# Patient Record
Sex: FEMALE | Race: BLACK OR AFRICAN AMERICAN | NOT HISPANIC OR LATINO | URBAN - METROPOLITAN AREA
[De-identification: names, ages, dates, MRNs, and addresses within clinical notes are randomized per-mention and may not be internally consistent; named-entity substitution may affect disease eponyms.]

---

## 2017-03-02 ENCOUNTER — EMERGENCY (EMERGENCY)
Facility: HOSPITAL | Age: 37
LOS: 0 days | Discharge: HOME | End: 2017-03-02
Attending: EMERGENCY MEDICINE
Payer: SELF-PAY

## 2017-03-02 VITALS
HEIGHT: 71 IN | OXYGEN SATURATION: 98 % | WEIGHT: 210.1 LBS | SYSTOLIC BLOOD PRESSURE: 147 MMHG | RESPIRATION RATE: 16 BRPM | TEMPERATURE: 98 F | HEART RATE: 100 BPM | DIASTOLIC BLOOD PRESSURE: 84 MMHG

## 2017-03-02 VITALS
HEART RATE: 66 BPM | TEMPERATURE: 98 F | DIASTOLIC BLOOD PRESSURE: 50 MMHG | OXYGEN SATURATION: 100 % | RESPIRATION RATE: 19 BRPM | SYSTOLIC BLOOD PRESSURE: 117 MMHG

## 2017-03-02 DIAGNOSIS — M25.472 EFFUSION, LEFT ANKLE: ICD-10-CM

## 2017-03-02 DIAGNOSIS — R60.0 LOCALIZED EDEMA: ICD-10-CM

## 2017-03-02 DIAGNOSIS — E11.9 TYPE 2 DIABETES MELLITUS WITHOUT COMPLICATIONS: ICD-10-CM

## 2017-03-02 LAB
ALBUMIN SERPL ELPH-MCNC: 3.2 G/DL — LOW (ref 3.3–5)
ALP SERPL-CCNC: 77 U/L — SIGNIFICANT CHANGE UP (ref 40–120)
ALT FLD-CCNC: 23 U/L — SIGNIFICANT CHANGE UP (ref 12–78)
ANION GAP SERPL CALC-SCNC: 7 MMOL/L — SIGNIFICANT CHANGE UP (ref 5–17)
AST SERPL-CCNC: 20 U/L — SIGNIFICANT CHANGE UP (ref 15–37)
BASOPHILS # BLD AUTO: 0 K/UL — SIGNIFICANT CHANGE UP (ref 0–0.2)
BASOPHILS NFR BLD AUTO: 0.5 % — SIGNIFICANT CHANGE UP (ref 0–2)
BILIRUB SERPL-MCNC: 0.3 MG/DL — SIGNIFICANT CHANGE UP (ref 0.2–1.2)
BUN SERPL-MCNC: 11 MG/DL — SIGNIFICANT CHANGE UP (ref 7–23)
CALCIUM SERPL-MCNC: 8.5 MG/DL — SIGNIFICANT CHANGE UP (ref 8.5–10.1)
CHLORIDE SERPL-SCNC: 103 MMOL/L — SIGNIFICANT CHANGE UP (ref 96–108)
CO2 SERPL-SCNC: 29 MMOL/L — SIGNIFICANT CHANGE UP (ref 22–31)
CREAT SERPL-MCNC: 0.83 MG/DL — SIGNIFICANT CHANGE UP (ref 0.5–1.3)
EOSINOPHIL # BLD AUTO: 0.1 K/UL — SIGNIFICANT CHANGE UP (ref 0–0.5)
EOSINOPHIL NFR BLD AUTO: 1.1 % — SIGNIFICANT CHANGE UP (ref 0–6)
GLUCOSE SERPL-MCNC: 91 MG/DL — SIGNIFICANT CHANGE UP (ref 70–99)
HCG SERPL-ACNC: <1 MIU/ML — SIGNIFICANT CHANGE UP
HCT VFR BLD CALC: 38.1 % — SIGNIFICANT CHANGE UP (ref 34.5–45)
HGB BLD-MCNC: 12.6 G/DL — SIGNIFICANT CHANGE UP (ref 11.5–15.5)
LYMPHOCYTES # BLD AUTO: 2.1 K/UL — SIGNIFICANT CHANGE UP (ref 1–3.3)
LYMPHOCYTES # BLD AUTO: 23.6 % — SIGNIFICANT CHANGE UP (ref 13–44)
MCHC RBC-ENTMCNC: 29.7 PG — SIGNIFICANT CHANGE UP (ref 27–34)
MCHC RBC-ENTMCNC: 33.2 GM/DL — SIGNIFICANT CHANGE UP (ref 32–36)
MCV RBC AUTO: 89.6 FL — SIGNIFICANT CHANGE UP (ref 80–100)
MONOCYTES # BLD AUTO: 0.8 K/UL — SIGNIFICANT CHANGE UP (ref 0–0.9)
MONOCYTES NFR BLD AUTO: 9 % — SIGNIFICANT CHANGE UP (ref 2–14)
NEUTROPHILS # BLD AUTO: 6 K/UL — SIGNIFICANT CHANGE UP (ref 1.8–7.4)
NEUTROPHILS NFR BLD AUTO: 65.7 % — SIGNIFICANT CHANGE UP (ref 43–77)
NT-PROBNP SERPL-SCNC: 8 PG/ML — SIGNIFICANT CHANGE UP (ref 0–125)
PLATELET # BLD AUTO: 511 K/UL — HIGH (ref 150–400)
POTASSIUM SERPL-MCNC: 4.1 MMOL/L — SIGNIFICANT CHANGE UP (ref 3.5–5.3)
POTASSIUM SERPL-SCNC: 4.1 MMOL/L — SIGNIFICANT CHANGE UP (ref 3.5–5.3)
PROT SERPL-MCNC: 8.2 GM/DL — SIGNIFICANT CHANGE UP (ref 6–8.3)
RBC # BLD: 4.26 M/UL — SIGNIFICANT CHANGE UP (ref 3.8–5.2)
RBC # FLD: 12.1 % — SIGNIFICANT CHANGE UP (ref 11–15)
SODIUM SERPL-SCNC: 139 MMOL/L — SIGNIFICANT CHANGE UP (ref 135–145)
WBC # BLD: 9.1 K/UL — SIGNIFICANT CHANGE UP (ref 3.8–10.5)
WBC # FLD AUTO: 9.1 K/UL — SIGNIFICANT CHANGE UP (ref 3.8–10.5)

## 2017-03-02 PROCEDURE — 99285 EMERGENCY DEPT VISIT HI MDM: CPT

## 2017-03-02 PROCEDURE — 73610 X-RAY EXAM OF ANKLE: CPT | Mod: 26,50

## 2017-03-02 PROCEDURE — 93970 EXTREMITY STUDY: CPT | Mod: 26

## 2017-03-02 RX ORDER — KETOROLAC TROMETHAMINE 30 MG/ML
30 SYRINGE (ML) INJECTION ONCE
Qty: 0 | Refills: 0 | Status: DISCONTINUED | OUTPATIENT
Start: 2017-03-02 | End: 2017-03-02

## 2017-03-02 RX ADMIN — Medication 30 MILLIGRAM(S): at 12:55

## 2017-03-02 RX ADMIN — Medication 30 MILLIGRAM(S): at 12:56

## 2017-03-02 NOTE — ED ADULT NURSE NOTE - OBJECTIVE STATEMENT
patient c/o of bilateral ankle pain started 1 week ago , denied chest pain or difficulty breathing at this

## 2017-03-02 NOTE — ED ADULT NURSE REASSESSMENT NOTE - NS ED NURSE REASSESS COMMENT FT1
patient A&Ox3 steady gait in no acute distress no pain at this time , discharge as orders heplock remove , left ER self ambulated with family on her side

## 2017-03-02 NOTE — ED PROVIDER NOTE - PHYSICAL EXAMINATION
Gen: Alert, Well appearing. NAD  Head: NC, AT, PERRL, EOMI, normal lids/conjunctiva ENT: Bilateral TM WNL, normal hearing, patent oropharynx without erythema/exudate, uvula midline Neck: supple, no tenderness/meningismus/JVD Pulm: Bilateral clear BS, normal resp effort, no wheeze/stridor/retractions  CV: RRR, no M/R/G, +dist pulses Abd: soft, NT/ND, +BS, no guarding/rebound tenderness  Mskel: no edema/erythema/cyanosis Skin: no rash   Neuro: AAOx3, no sensory/motor deficits, CN 2-12 intact   b/l ankle swelling and b/l tenderness. from of ankles, dp/pt intact, no erythema, no calf tenderness

## 2017-03-02 NOTE — ED ADULT TRIAGE NOTE - CHIEF COMPLAINT QUOTE
bilateral ankle pain and swelling (trace edema)   denies shortness of breath   2016 with gestational diabetes

## 2017-03-02 NOTE — ED PROVIDER NOTE - MEDICAL DECISION MAKING DETAILS
Likely dependent edema.Sono neg for DVT. xray wnl. Discussed results and outcome of testing with the patient.  Patient given copy of available results. Patient advised to please follow up with their PMD within the next 24 hours and return to the Emergency Department for worsening symptoms or any other concerns.

## 2017-07-29 ENCOUNTER — EMERGENCY (EMERGENCY)
Facility: HOSPITAL | Age: 37
LOS: 1 days | Discharge: LEFT BEFORE DISCHARGE | End: 2017-07-29

## 2017-07-29 VITALS
WEIGHT: 197.98 LBS | HEIGHT: 71 IN | OXYGEN SATURATION: 99 % | HEART RATE: 96 BPM | DIASTOLIC BLOOD PRESSURE: 53 MMHG | SYSTOLIC BLOOD PRESSURE: 118 MMHG | RESPIRATION RATE: 18 BRPM | TEMPERATURE: 98 F

## 2017-07-29 DIAGNOSIS — R07.9 CHEST PAIN, UNSPECIFIED: ICD-10-CM

## 2017-07-29 NOTE — ED ADULT NURSE NOTE - EXPLANATION OF PATIENT'S REASON FOR LEAVING
"I don't want to wait" wishes to leave without being seen by MD. Educated of risks of leaving without being seen, states will follow up with PMD. Seen leaving ED with strong steady straight gait with child and spouse.

## 2017-07-29 NOTE — ED ADULT TRIAGE NOTE - CHIEF COMPLAINT QUOTE
reports having dull pain under left breast, worsens with touch, denies cough, fevers, chills, vomiting, nausea, diarrhea.
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
## 2018-04-13 NOTE — ED ADULT NURSE NOTE - EXTENSIONS OF SELF_ADULT
Pt leaves  asking what else she can do for her migraine. She states in vm she is having a hard time getting rid of it. Please advise.   
Writer contacted patient and she states that she did take some naproxen as well as the imitrex and it does seem to be getting better now.  Advised that she be seen if it does not improve and patient verbalized understanding.  
None

## 2018-08-13 ENCOUNTER — EMERGENCY (EMERGENCY)
Facility: HOSPITAL | Age: 38
LOS: 0 days | Discharge: HOME | End: 2018-08-13
Attending: EMERGENCY MEDICINE
Payer: COMMERCIAL

## 2018-08-13 VITALS
OXYGEN SATURATION: 100 % | WEIGHT: 216.93 LBS | TEMPERATURE: 98 F | HEART RATE: 84 BPM | SYSTOLIC BLOOD PRESSURE: 115 MMHG | HEIGHT: 71 IN | RESPIRATION RATE: 16 BRPM | DIASTOLIC BLOOD PRESSURE: 76 MMHG
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
## 2018-08-13 DIAGNOSIS — M79.672 PAIN IN LEFT FOOT: ICD-10-CM

## 2018-08-13 DIAGNOSIS — E11.9 TYPE 2 DIABETES MELLITUS WITHOUT COMPLICATIONS: ICD-10-CM
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
## 2018-08-13 DIAGNOSIS — Z79.4 LONG TERM (CURRENT) USE OF INSULIN: ICD-10-CM

## 2018-08-13 DIAGNOSIS — S93.602A UNSPECIFIED SPRAIN OF LEFT FOOT, INITIAL ENCOUNTER: ICD-10-CM

## 2018-08-13 PROCEDURE — 73630 X-RAY EXAM OF FOOT: CPT | Mod: 26,LT

## 2018-08-13 PROCEDURE — 99283 EMERGENCY DEPT VISIT LOW MDM: CPT

## 2018-08-13 NOTE — ED ADULT NURSE NOTE - NSIMPLEMENTINTERV_GEN_ALL_ED
Implemented All Universal Safety Interventions:  [place] to call system. Call bell, personal items and telephone within reach. Instruct patient to call for assistance. Room bathroom lighting operational. Non-slip footwear when patient is off stretcher. Physically safe environment: no spills, clutter or unnecessary equipment. Stretcher in lowest position, wheels locked, appropriate side rails in place.

## 2018-08-13 NOTE — ED ADULT NURSE NOTE - OBJECTIVE STATEMENT
as per pt " for the past week I have noticed my left ankle swollen with rest the swelling goes away then appear again during the day."

## 2018-10-24 ENCOUNTER — EMERGENCY (EMERGENCY)
Facility: HOSPITAL | Age: 38
LOS: 0 days | Discharge: HOME | End: 2018-10-24
Attending: EMERGENCY MEDICINE
Payer: COMMERCIAL

## 2018-10-24 VITALS
HEIGHT: 71 IN | RESPIRATION RATE: 17 BRPM | OXYGEN SATURATION: 99 % | WEIGHT: 210.1 LBS | SYSTOLIC BLOOD PRESSURE: 127 MMHG | HEART RATE: 79 BPM | DIASTOLIC BLOOD PRESSURE: 67 MMHG | TEMPERATURE: 98 F
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
## 2018-10-24 DIAGNOSIS — M54.2 CERVICALGIA: ICD-10-CM

## 2018-10-24 DIAGNOSIS — E11.9 TYPE 2 DIABETES MELLITUS WITHOUT COMPLICATIONS: ICD-10-CM
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
## 2018-10-24 DIAGNOSIS — S16.1XXA STRAIN OF MUSCLE, FASCIA AND TENDON AT NECK LEVEL, INITIAL ENCOUNTER: ICD-10-CM

## 2018-10-24 DIAGNOSIS — D86.9 SARCOIDOSIS, UNSPECIFIED: ICD-10-CM

## 2018-10-24 PROCEDURE — 99284 EMERGENCY DEPT VISIT MOD MDM: CPT

## 2018-10-24 PROCEDURE — 72125 CT NECK SPINE W/O DYE: CPT | Mod: 26

## 2018-10-24 RX ORDER — IBUPROFEN 200 MG
1 TABLET ORAL
Qty: 20 | Refills: 0
Start: 2018-10-24 | End: 2018-10-28

## 2018-10-24 RX ORDER — CYCLOBENZAPRINE HYDROCHLORIDE 10 MG/1
1 TABLET, FILM COATED ORAL
Qty: 15 | Refills: 0
Start: 2018-10-24 | End: 2018-10-28

## 2018-10-24 RX ORDER — DIAZEPAM 5 MG
5 TABLET ORAL ONCE
Qty: 0 | Refills: 0 | Status: DISCONTINUED | OUTPATIENT
Start: 2018-10-24 | End: 2018-10-24

## 2018-10-24 RX ORDER — IBUPROFEN 200 MG
600 TABLET ORAL ONCE
Qty: 0 | Refills: 0 | Status: DISCONTINUED | OUTPATIENT
Start: 2018-10-24 | End: 2018-10-24

## 2018-10-24 NOTE — ED ADULT NURSE NOTE - NSIMPLEMENTINTERV_GEN_ALL_ED
Implemented All Universal Safety Interventions:  [place] to call system. Call bell, personal items and telephone within reach. Instruct patient to call for assistance. Room bathroom lighting operational. Non-slip footwear when patient is off stretcher. Physically safe environment: no spills, clutter or unnecessary equipment. Stretcher in lowest position, wheels locked, appropriate side rails in place.

## 2018-10-24 NOTE — ED PROVIDER NOTE - NEUROLOGICAL, MLM
Alert and oriented, no focal deficits, no motor or sensory deficits to bilateral upper and lower extremities
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
## 2018-10-24 NOTE — ED PROVIDER NOTE - NSFOLLOWUPCLINICS_GEN_ALL_ED_FT
[facility]  Internal Medicine  [address] [place] [address]  Phone: [phone]  Fax:   Follow Up Time:
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
## 2018-10-24 NOTE — ED PROVIDER NOTE - MUSCULOSKELETAL, MLM
Spine appears normal, range of motion is not limited, + midline and left sided lower cervical tenderness, limited range of motion looking toward left
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
## 2019-04-02 NOTE — ED PROVIDER NOTE - CPE EDP NEURO NORM
Dr. Mojica called to inform us that pts pre-op CXR shows a vague opacity over posterior mid thoracic spine on lateral view. CT thorax w/o contrast is recommended to get a better view. Order placed and will have Dr. Millard review & sign.    normal...

## 2019-09-30 ENCOUNTER — EMERGENCY (EMERGENCY)
Facility: HOSPITAL | Age: 39
LOS: 0 days | Discharge: HOME | End: 2019-09-30
Attending: EMERGENCY MEDICINE
Payer: COMMERCIAL

## 2019-09-30 VITALS
WEIGHT: 210.1 LBS | SYSTOLIC BLOOD PRESSURE: 128 MMHG | TEMPERATURE: 98 F | HEIGHT: 71 IN | RESPIRATION RATE: 18 BRPM | DIASTOLIC BLOOD PRESSURE: 71 MMHG | OXYGEN SATURATION: 100 % | HEART RATE: 89 BPM

## 2019-09-30 VITALS
SYSTOLIC BLOOD PRESSURE: 113 MMHG | TEMPERATURE: 98 F | RESPIRATION RATE: 19 BRPM | OXYGEN SATURATION: 95 % | HEART RATE: 76 BPM | DIASTOLIC BLOOD PRESSURE: 64 MMHG

## 2019-09-30 DIAGNOSIS — R10.11 RIGHT UPPER QUADRANT PAIN: ICD-10-CM

## 2019-09-30 DIAGNOSIS — E11.9 TYPE 2 DIABETES MELLITUS WITHOUT COMPLICATIONS: ICD-10-CM

## 2019-09-30 DIAGNOSIS — D86.9 SARCOIDOSIS, UNSPECIFIED: ICD-10-CM

## 2019-09-30 LAB
ALBUMIN SERPL ELPH-MCNC: 3.2 G/DL — LOW (ref 3.3–5)
ALP SERPL-CCNC: 77 U/L — SIGNIFICANT CHANGE UP (ref 40–120)
ALT FLD-CCNC: 16 U/L — SIGNIFICANT CHANGE UP (ref 12–78)
ANION GAP SERPL CALC-SCNC: 6 MMOL/L — SIGNIFICANT CHANGE UP (ref 5–17)
APTT BLD: 33.1 SEC — SIGNIFICANT CHANGE UP (ref 27.5–36.3)
AST SERPL-CCNC: 11 U/L — LOW (ref 15–37)
BASOPHILS # BLD AUTO: 0.04 K/UL — SIGNIFICANT CHANGE UP (ref 0–0.2)
BASOPHILS NFR BLD AUTO: 0.4 % — SIGNIFICANT CHANGE UP (ref 0–2)
BILIRUB DIRECT SERPL-MCNC: 0.1 MG/DL — SIGNIFICANT CHANGE UP (ref 0.05–0.2)
BILIRUB SERPL-MCNC: 0.2 MG/DL — SIGNIFICANT CHANGE UP (ref 0.2–1.2)
BUN SERPL-MCNC: 9 MG/DL — SIGNIFICANT CHANGE UP (ref 7–23)
CALCIUM SERPL-MCNC: 8.3 MG/DL — LOW (ref 8.5–10.1)
CHLORIDE SERPL-SCNC: 105 MMOL/L — SIGNIFICANT CHANGE UP (ref 96–108)
CO2 SERPL-SCNC: 27 MMOL/L — SIGNIFICANT CHANGE UP (ref 22–31)
CREAT SERPL-MCNC: 0.73 MG/DL — SIGNIFICANT CHANGE UP (ref 0.5–1.3)
EOSINOPHIL # BLD AUTO: 0.11 K/UL — SIGNIFICANT CHANGE UP (ref 0–0.5)
EOSINOPHIL NFR BLD AUTO: 1 % — SIGNIFICANT CHANGE UP (ref 0–6)
GLUCOSE SERPL-MCNC: 134 MG/DL — HIGH (ref 70–99)
HCG SERPL-ACNC: <1 MIU/ML — SIGNIFICANT CHANGE UP
HCT VFR BLD CALC: 35.7 % — SIGNIFICANT CHANGE UP (ref 34.5–45)
HGB BLD-MCNC: 11 G/DL — LOW (ref 11.5–15.5)
IMM GRANULOCYTES NFR BLD AUTO: 0.6 % — SIGNIFICANT CHANGE UP (ref 0–1.5)
INR BLD: 0.99 RATIO — SIGNIFICANT CHANGE UP (ref 0.88–1.16)
LIDOCAIN IGE QN: 183 U/L — SIGNIFICANT CHANGE UP (ref 73–393)
LYMPHOCYTES # BLD AUTO: 2.47 K/UL — SIGNIFICANT CHANGE UP (ref 1–3.3)
LYMPHOCYTES # BLD AUTO: 23.5 % — SIGNIFICANT CHANGE UP (ref 13–44)
MCHC RBC-ENTMCNC: 26.7 PG — LOW (ref 27–34)
MCHC RBC-ENTMCNC: 30.8 GM/DL — LOW (ref 32–36)
MCV RBC AUTO: 86.7 FL — SIGNIFICANT CHANGE UP (ref 80–100)
MONOCYTES # BLD AUTO: 0.77 K/UL — SIGNIFICANT CHANGE UP (ref 0–0.9)
MONOCYTES NFR BLD AUTO: 7.3 % — SIGNIFICANT CHANGE UP (ref 2–14)
NEUTROPHILS # BLD AUTO: 7.05 K/UL — SIGNIFICANT CHANGE UP (ref 1.8–7.4)
NEUTROPHILS NFR BLD AUTO: 67.2 % — SIGNIFICANT CHANGE UP (ref 43–77)
NRBC # BLD: 0 /100 WBCS — SIGNIFICANT CHANGE UP (ref 0–0)
PLATELET # BLD AUTO: 455 K/UL — HIGH (ref 150–400)
POTASSIUM SERPL-MCNC: 4.1 MMOL/L — SIGNIFICANT CHANGE UP (ref 3.5–5.3)
POTASSIUM SERPL-SCNC: 4.1 MMOL/L — SIGNIFICANT CHANGE UP (ref 3.5–5.3)
PROT SERPL-MCNC: 7.7 GM/DL — SIGNIFICANT CHANGE UP (ref 6–8.3)
PROTHROM AB SERPL-ACNC: 11.1 SEC — SIGNIFICANT CHANGE UP (ref 10–12.9)
RBC # BLD: 4.12 M/UL — SIGNIFICANT CHANGE UP (ref 3.8–5.2)
RBC # FLD: 14.2 % — SIGNIFICANT CHANGE UP (ref 10.3–14.5)
SODIUM SERPL-SCNC: 138 MMOL/L — SIGNIFICANT CHANGE UP (ref 135–145)
WBC # BLD: 10.5 K/UL — SIGNIFICANT CHANGE UP (ref 3.8–10.5)
WBC # FLD AUTO: 10.5 K/UL — SIGNIFICANT CHANGE UP (ref 3.8–10.5)

## 2019-09-30 PROCEDURE — 93010 ELECTROCARDIOGRAM REPORT: CPT

## 2019-09-30 PROCEDURE — 99285 EMERGENCY DEPT VISIT HI MDM: CPT

## 2019-09-30 PROCEDURE — 74177 CT ABD & PELVIS W/CONTRAST: CPT | Mod: 26

## 2019-09-30 RX ORDER — SODIUM CHLORIDE 9 MG/ML
1000 INJECTION INTRAMUSCULAR; INTRAVENOUS; SUBCUTANEOUS ONCE
Refills: 0 | Status: COMPLETED | OUTPATIENT
Start: 2019-09-30 | End: 2019-09-30

## 2019-09-30 RX ORDER — ACETAMINOPHEN 500 MG
650 TABLET ORAL ONCE
Refills: 0 | Status: COMPLETED | OUTPATIENT
Start: 2019-09-30 | End: 2019-09-30

## 2019-09-30 RX ORDER — INSULIN HUMAN 100 [IU]/ML
0 INJECTION, SOLUTION SUBCUTANEOUS
Qty: 0 | Refills: 0 | DISCHARGE

## 2019-09-30 RX ORDER — HYDROXYCHLOROQUINE SULFATE 200 MG
1 TABLET ORAL
Qty: 0 | Refills: 0 | DISCHARGE

## 2019-09-30 RX ORDER — GLIMEPIRIDE 1 MG
0 TABLET ORAL
Qty: 0 | Refills: 0 | DISCHARGE

## 2019-09-30 RX ORDER — ATORVASTATIN CALCIUM 80 MG/1
0 TABLET, FILM COATED ORAL
Qty: 0 | Refills: 0 | DISCHARGE

## 2019-09-30 RX ORDER — MORPHINE SULFATE 50 MG/1
2 CAPSULE, EXTENDED RELEASE ORAL ONCE
Refills: 0 | Status: DISCONTINUED | OUTPATIENT
Start: 2019-09-30 | End: 2019-09-30

## 2019-09-30 RX ADMIN — MORPHINE SULFATE 2 MILLIGRAM(S): 50 CAPSULE, EXTENDED RELEASE ORAL at 06:24

## 2019-09-30 RX ADMIN — MORPHINE SULFATE 2 MILLIGRAM(S): 50 CAPSULE, EXTENDED RELEASE ORAL at 05:57

## 2019-09-30 RX ADMIN — SODIUM CHLORIDE 1000 MILLILITER(S): 9 INJECTION INTRAMUSCULAR; INTRAVENOUS; SUBCUTANEOUS at 05:32

## 2019-09-30 RX ADMIN — SODIUM CHLORIDE 1000 MILLILITER(S): 9 INJECTION INTRAMUSCULAR; INTRAVENOUS; SUBCUTANEOUS at 06:25

## 2019-09-30 RX ADMIN — Medication 650 MILLIGRAM(S): at 08:14

## 2019-09-30 NOTE — ED ADULT NURSE NOTE - NSIMPLEMENTINTERV_GEN_ALL_ED
Implemented All Universal Safety Interventions:  [place] to call system. Call bell, personal items and telephone within reach. Instruct patient to call for assistance. Room bathroom lighting operational. Non-slip footwear when patient is off stretcher. Physically safe environment: no spills, clutter or unnecessary equipment. Stretcher in lowest position, wheels locked, appropriate side rails in place.

## 2019-09-30 NOTE — ED PROVIDER NOTE - OBJECTIVE STATEMENT
Pertinent PMH/PSH/FHx/SHx and Review of Systems contained within:    38yo F w PMH of DM, sarcoidosis, no previous abd surgeries, presents to ED for eval of abd pain x2d.  Pt states pain mostly in RUQ, but pt has felt intermittent pain on L side.  Denies fever, chills, CP, SOB, vomiting, diarrhea.  Pt states pain exacerbated by palpation and movement.  Pt states she took advil for pain w mild improvement.    No fever/chills, No photophobia/eye pain/changes in vision, No ear pain/sore throat/dysphagia, No chest pain/palpitations, no SOB/cough/wheeze/stridor, +abdominal pain, No neck pain, no rash, no changes in neurological status/function.

## 2019-09-30 NOTE — ED ADULT NURSE NOTE - CHPI ED NUR SYMPTOMS NEG
no vomiting/no nausea/no fever/no abdominal distension/no blood in stool/no burning urination/no diarrhea/no hematuria/no chills/no dysuria

## 2019-09-30 NOTE — ED PROVIDER NOTE - NSFOLLOWUPCLINICS_GEN_ALL_ED_FT
[facility]  Gynceology/OB  [address] [place] [address]  Phone: [phone]  Fax:   Follow Up Time:

## 2019-09-30 NOTE — ED PROVIDER NOTE - NS ED MD DISPO DISCHARGE
[facility] [address]  [address]    NAME: LARISA RAMIREZ                                       /AGE/SEX: 1954 - 64 - F  PHYSICIAN: Hannah Mcneil M.D.                                    ADMIT DATE: 18  UNIT #: [id number]                                                ACCT #: [id number]                                                                    LOC//BED: [id number]                                             PHYSICIAN REPORT                                           DISCHARGE SUMMARY                                         REPORT # : [id number]                                        * * *  ADDENDUM  * * *  Addendum: Hannah Hernandez M.D. on 19 @ 10:00    Please change date of discharge to  2018  _____________________________________________________________________________________  <Electronically signed by Hannah Hernandez M.D.>  Hannah Hernandez Hannah Hernandez M.D.;   Home

## 2019-09-30 NOTE — ED PROVIDER NOTE - PATIENT PORTAL LINK FT
You can access the FollowMyHealth Patient Portal offered by [facility] by registering at the following website: [email or web address]. By joining [employer]’s FollowMyHealth portal, you will also be able to view your health information using other applications (apps) compatible with our system.

## 2019-09-30 NOTE — ED ADULT NURSE NOTE - OBJECTIVE STATEMENT
Patient reports RUQ abdominal pain since Sunday at 2 am. denies nausea, vomiting or diarrhea. Denies fever or chills. Pain woke patient from sleep this morning.

## 2019-09-30 NOTE — ED PROVIDER NOTE - CLINICAL SUMMARY MEDICAL DECISION MAKING FREE TEXT BOX
Pt w abd pain, pending CT. Preliminary CT demonstrates right corpus luteum cyst, otherwise labs and CT unremarkable; analgesics, OB/GYN follow up

## 2019-09-30 NOTE — ED PROVIDER NOTE - PHYSICAL EXAMINATION
Gen: Alert, c/o pain  Head: NC, AT, EOMI, normal lids/conjunctiva  ENT: normal hearing, patent oropharynx, MMM  Neck: supple, no tenderness/meningismus, FROM, Trachea midline  Pulm: Bilateral clear BS, normal resp effort, no wheeze/stridor/retractions  CV: RRR, no M/R/G, +dist pulses  Abd: soft, +RUQ TTP, ND, +BS, no guarding/rebound tenderness  Mskel: no edema/erythema/cyanosis  Skin: no rash  Neuro: no sensory/motor deficits

## 2020-01-02 ENCOUNTER — EMERGENCY (EMERGENCY)
Facility: HOSPITAL | Age: 40
LOS: 0 days | Discharge: HOME | End: 2020-01-02
Attending: STUDENT IN AN ORGANIZED HEALTH CARE EDUCATION/TRAINING PROGRAM
Payer: COMMERCIAL

## 2020-01-02 VITALS
HEIGHT: 71 IN | RESPIRATION RATE: 18 BRPM | OXYGEN SATURATION: 99 % | WEIGHT: 210.1 LBS | DIASTOLIC BLOOD PRESSURE: 53 MMHG | HEART RATE: 88 BPM | TEMPERATURE: 99 F | SYSTOLIC BLOOD PRESSURE: 128 MMHG

## 2020-01-02 VITALS
SYSTOLIC BLOOD PRESSURE: 117 MMHG | OXYGEN SATURATION: 98 % | DIASTOLIC BLOOD PRESSURE: 78 MMHG | RESPIRATION RATE: 19 BRPM | HEART RATE: 78 BPM | TEMPERATURE: 98 F

## 2020-01-02 DIAGNOSIS — R79.9 ABNORMAL FINDING OF BLOOD CHEMISTRY, UNSPECIFIED: ICD-10-CM

## 2020-01-02 DIAGNOSIS — R53.83 OTHER FATIGUE: ICD-10-CM

## 2020-01-02 DIAGNOSIS — R30.0 DYSURIA: ICD-10-CM

## 2020-01-02 DIAGNOSIS — R10.30 LOWER ABDOMINAL PAIN, UNSPECIFIED: ICD-10-CM

## 2020-01-02 LAB
ALBUMIN SERPL ELPH-MCNC: 3.4 G/DL — SIGNIFICANT CHANGE UP (ref 3.3–5)
ALP SERPL-CCNC: 91 U/L — SIGNIFICANT CHANGE UP (ref 40–120)
ALT FLD-CCNC: 21 U/L — SIGNIFICANT CHANGE UP (ref 12–78)
ANION GAP SERPL CALC-SCNC: 10 MMOL/L — SIGNIFICANT CHANGE UP (ref 5–17)
APPEARANCE UR: CLEAR — SIGNIFICANT CHANGE UP
AST SERPL-CCNC: 11 U/L — LOW (ref 15–37)
BACTERIA # UR AUTO: ABNORMAL
BASOPHILS # BLD AUTO: 0.05 K/UL — SIGNIFICANT CHANGE UP (ref 0–0.2)
BASOPHILS NFR BLD AUTO: 0.4 % — SIGNIFICANT CHANGE UP (ref 0–2)
BILIRUB SERPL-MCNC: 0.2 MG/DL — SIGNIFICANT CHANGE UP (ref 0.2–1.2)
BILIRUB UR-MCNC: NEGATIVE — SIGNIFICANT CHANGE UP
BUN SERPL-MCNC: 10 MG/DL — SIGNIFICANT CHANGE UP (ref 7–23)
CALCIUM SERPL-MCNC: 9.1 MG/DL — SIGNIFICANT CHANGE UP (ref 8.5–10.1)
CHLORIDE SERPL-SCNC: 101 MMOL/L — SIGNIFICANT CHANGE UP (ref 96–108)
CO2 SERPL-SCNC: 27 MMOL/L — SIGNIFICANT CHANGE UP (ref 22–31)
COLOR SPEC: YELLOW — SIGNIFICANT CHANGE UP
CREAT SERPL-MCNC: 0.8 MG/DL — SIGNIFICANT CHANGE UP (ref 0.5–1.3)
DIFF PNL FLD: ABNORMAL
EOSINOPHIL # BLD AUTO: 0.12 K/UL — SIGNIFICANT CHANGE UP (ref 0–0.5)
EOSINOPHIL NFR BLD AUTO: 1 % — SIGNIFICANT CHANGE UP (ref 0–6)
EPI CELLS # UR: SIGNIFICANT CHANGE UP
GLUCOSE BLDC GLUCOMTR-MCNC: 162 MG/DL — HIGH (ref 70–99)
GLUCOSE BLDC GLUCOMTR-MCNC: 218 MG/DL — HIGH (ref 70–99)
GLUCOSE SERPL-MCNC: 175 MG/DL — HIGH (ref 70–99)
GLUCOSE UR QL: 1000 MG/DL
HCT VFR BLD CALC: 36.6 % — SIGNIFICANT CHANGE UP (ref 34.5–45)
HGB BLD-MCNC: 11.4 G/DL — LOW (ref 11.5–15.5)
IMM GRANULOCYTES NFR BLD AUTO: 0.3 % — SIGNIFICANT CHANGE UP (ref 0–1.5)
KETONES UR-MCNC: NEGATIVE — SIGNIFICANT CHANGE UP
LEUKOCYTE ESTERASE UR-ACNC: NEGATIVE — SIGNIFICANT CHANGE UP
LYMPHOCYTES # BLD AUTO: 36.3 % — SIGNIFICANT CHANGE UP (ref 13–44)
LYMPHOCYTES # BLD AUTO: 4.16 K/UL — HIGH (ref 1–3.3)
MCHC RBC-ENTMCNC: 26.2 PG — LOW (ref 27–34)
MCHC RBC-ENTMCNC: 31.1 GM/DL — LOW (ref 32–36)
MCV RBC AUTO: 84.1 FL — SIGNIFICANT CHANGE UP (ref 80–100)
MONOCYTES # BLD AUTO: 0.84 K/UL — SIGNIFICANT CHANGE UP (ref 0–0.9)
MONOCYTES NFR BLD AUTO: 7.3 % — SIGNIFICANT CHANGE UP (ref 2–14)
NEUTROPHILS # BLD AUTO: 6.26 K/UL — SIGNIFICANT CHANGE UP (ref 1.8–7.4)
NEUTROPHILS NFR BLD AUTO: 54.7 % — SIGNIFICANT CHANGE UP (ref 43–77)
NITRITE UR-MCNC: NEGATIVE — SIGNIFICANT CHANGE UP
NRBC # BLD: 0 /100 WBCS — SIGNIFICANT CHANGE UP (ref 0–0)
PH UR: 5 — SIGNIFICANT CHANGE UP (ref 5–8)
PLATELET # BLD AUTO: 552 K/UL — HIGH (ref 150–400)
POTASSIUM SERPL-MCNC: 3.8 MMOL/L — SIGNIFICANT CHANGE UP (ref 3.5–5.3)
POTASSIUM SERPL-SCNC: 3.8 MMOL/L — SIGNIFICANT CHANGE UP (ref 3.5–5.3)
PROT SERPL-MCNC: 8.4 GM/DL — HIGH (ref 6–8.3)
PROT UR-MCNC: NEGATIVE MG/DL — SIGNIFICANT CHANGE UP
RBC # BLD: 4.35 M/UL — SIGNIFICANT CHANGE UP (ref 3.8–5.2)
RBC # FLD: 13.8 % — SIGNIFICANT CHANGE UP (ref 10.3–14.5)
RBC CASTS # UR COMP ASSIST: ABNORMAL /HPF (ref 0–4)
SODIUM SERPL-SCNC: 138 MMOL/L — SIGNIFICANT CHANGE UP (ref 135–145)
SP GR SPEC: 1.02 — SIGNIFICANT CHANGE UP (ref 1.01–1.02)
TROPONIN I SERPL-MCNC: <.015 NG/ML — SIGNIFICANT CHANGE UP (ref 0.01–0.04)
TSH SERPL-MCNC: 2.9 UIU/ML — SIGNIFICANT CHANGE UP (ref 0.36–3.74)
UROBILINOGEN FLD QL: NEGATIVE MG/DL — SIGNIFICANT CHANGE UP
WBC # BLD: 11.72 K/UL — HIGH (ref 3.8–10.5)
WBC # FLD AUTO: 11.72 K/UL — HIGH (ref 3.8–10.5)
WBC UR QL: ABNORMAL

## 2020-01-02 PROCEDURE — 93010 ELECTROCARDIOGRAM REPORT: CPT

## 2020-01-02 PROCEDURE — 73070 X-RAY EXAM OF ELBOW: CPT | Mod: 26,RT

## 2020-01-02 PROCEDURE — 71045 X-RAY EXAM CHEST 1 VIEW: CPT | Mod: 26

## 2020-01-02 PROCEDURE — 99284 EMERGENCY DEPT VISIT MOD MDM: CPT

## 2020-01-02 RX ORDER — SODIUM CHLORIDE 9 MG/ML
2000 INJECTION INTRAMUSCULAR; INTRAVENOUS; SUBCUTANEOUS ONCE
Refills: 0 | Status: COMPLETED | OUTPATIENT
Start: 2020-01-02 | End: 2020-01-02

## 2020-01-02 RX ADMIN — SODIUM CHLORIDE 2000 MILLILITER(S): 9 INJECTION INTRAMUSCULAR; INTRAVENOUS; SUBCUTANEOUS at 18:00

## 2020-01-02 NOTE — ED PROVIDER NOTE - PATIENT PORTAL LINK FT
You can access the FollowMyHealth Patient Portal offered by [facility] by registering at the following website: [email or web address]. By joining [employer]’s FollowMyHealth portal, you will also be able to view your health information using other applications (apps) compatible with our system.

## 2020-01-02 NOTE — ED PROVIDER NOTE - NSFOLLOWUPINSTRUCTIONS_ED_ALL_ED_FT
Please return to Emergency Department immediately for any new, concerning, or worsening symptoms.   Please follow-up with PMD and endocrinology  as recommended.    Please take prescriptions as discussed.

## 2020-01-02 NOTE — ED ADULT NURSE NOTE - NSIMPLEMENTINTERV_GEN_ALL_ED
Implemented All Fall Risk Interventions:  [place] to call system. Call bell, personal items and telephone within reach. Instruct patient to call for assistance. Room bathroom lighting operational. Non-slip footwear when patient is off stretcher. Physically safe environment: no spills, clutter or unnecessary equipment. Stretcher in lowest position, wheels locked, appropriate side rails in place. Provide visual cue, wrist band, yellow gown, etc. Monitor gait and stability. Monitor for mental status changes and reorient to person, place, and time. Review medications for side effects contributing to fall risk. Reinforce activity limits and safety measures with patient and family.

## 2020-01-02 NOTE — ED PROVIDER NOTE - OBJECTIVE STATEMENT
38 yo female with PMH DM, sarcoidosis. presents to ED for evaluation of abnormal bloodwork. Patient was seen by PCP and was recommended to come to ER for finding of AIC 9.1 and glucose 300s. Patient report she has been having lightheadedness. Denies chest pain, shortness. +abd pain lower, intermittent, especially in the evening. +blurry vision, tremulous hands, forgetfulness. +polyuria, +polydipsia. + fevers. Denies flu like symptoms. +dysuria x 2 days. Reports during last fall, she fell on stairs, denies LOC, +head injury. Denies AC. Reports pain over right elbow.  PMD: Dr. Mendez.

## 2020-01-02 NOTE — ED ADULT TRIAGE NOTE - CHIEF COMPLAINT QUOTE
states she was referred by PCP to be evaluated for A1c 9.1. states she feels shaky, her vision is blurry and she is very thirsty and has increased urinary frequency. history of dm. fs 218 at triage.

## 2020-01-02 NOTE — ED PROVIDER NOTE - NSFOLLOWUPCLINICS_GEN_ALL_ED_FT
[facility]  Endocrinology  [address] [place] [address]  Phone: [phone]  Fax:   Follow Up Time:

## 2020-01-03 LAB
B-OH-BUTYR SERPL-SCNC: 0.1 MMOL/L — SIGNIFICANT CHANGE UP
CULTURE RESULTS: SIGNIFICANT CHANGE UP
SPECIMEN SOURCE: SIGNIFICANT CHANGE UP

## 2020-06-08 ENCOUNTER — APPOINTMENT (OUTPATIENT)
Dept: ENDOCRINOLOGY | Facility: CLINIC | Age: 40
End: 2020-06-08

## 2020-06-12 PROBLEM — Z00.00 ENCOUNTER FOR PREVENTIVE HEALTH EXAMINATION: Status: ACTIVE | Noted: 2020-06-12

## 2020-07-09 ENCOUNTER — APPOINTMENT (OUTPATIENT)
Dept: ENDOCRINOLOGY | Facility: CLINIC | Age: 40
End: 2020-07-09
Payer: MEDICARE

## 2020-07-09 ENCOUNTER — APPOINTMENT (OUTPATIENT)
Dept: ENDOCRINOLOGY | Facility: CLINIC | Age: 40
End: 2020-07-09

## 2020-07-09 DIAGNOSIS — D86.9 SARCOIDOSIS, UNSPECIFIED: ICD-10-CM

## 2020-07-09 DIAGNOSIS — Z78.9 OTHER SPECIFIED HEALTH STATUS: ICD-10-CM

## 2020-07-09 PROCEDURE — 99204 OFFICE O/P NEW MOD 45 MIN: CPT | Mod: 95

## 2020-07-09 NOTE — HISTORY OF PRESENT ILLNESS
[Home] : at home, [unfilled] , at the time of the visit. [Medical Office: ([facility])___] : at the medical office located in  [Verbal consent obtained from patient] : the patient, [unfilled] [FreeTextEntry1] : HISTORY OF PRESENT ILLNESS. \par \par Ms. TAPIA was diagnosed with Diabetes Mellitus Type 2 in 2014 \par Reports history HTN, dyslipidemia, sarcoidosis. She denies CAD,  known complications of retinopathy, nephropathy, or neuropathy. \par She reports a GI intolerance on metformin, and is presently on Basaglar 20 un HS, Novolog 16-0-16. She was briefly on Victoza  (using samples) with some help, but is not taking right now. She was previously under care of Dr. Membreno.\par Blood sugars are checked 2-3 times a day. She reports FBS in low 200's, ppg- 240's- 260's\par Hypoglycemia frequency: none\par Diet: not following ADA\par Exercise: none\par \par Last dilated eye - 2019\par Last podiatry visit  - 2019\par Last cardiology evaluation - none\par Last stress test - none\par Last 2-D Echo - does not recall\par Last nephrology evaluation - none\par Last neurology evaluation- none\par \par Lab review: a1c- \par \par

## 2020-07-09 NOTE — ASSESSMENT
[FreeTextEntry1] : Current approaches to diabetes management are discussed with the patient. \par Target ranges for blood sugar, blood pressure and cholesterol reviewed, and risk reduction strategies verified. \par Hypoglycemia precautions reviewed with the patient. \par Suggested extensive diabetes education program, including nutritional and diabetes teaching and evaluation. \par Proper dietary restrictions and exercise routines discussed. \par Glucometer/SMBG and log book charting discussed.\par - needs fasting blood work\par - return to the office for Jose PRO placement\par - continue present regimen, and if able will introduce OHG and possibly GLP1 agonists. Once glycemia improves and assuming there are no contraindications, will add SGLT2 inhibitors\par - monitor lipids, blood pressure\par Return the sensor in 2-3 weeks and set up an appointment with CDE\par

## 2020-08-13 ENCOUNTER — EMERGENCY (EMERGENCY)
Facility: HOSPITAL | Age: 40
LOS: 0 days | Discharge: HOME | End: 2020-08-13
Payer: COMMERCIAL

## 2020-08-13 VITALS
SYSTOLIC BLOOD PRESSURE: 148 MMHG | RESPIRATION RATE: 16 BRPM | HEIGHT: 71 IN | WEIGHT: 210.1 LBS | OXYGEN SATURATION: 99 % | DIASTOLIC BLOOD PRESSURE: 77 MMHG | HEART RATE: 117 BPM

## 2020-08-13 DIAGNOSIS — E11.9 TYPE 2 DIABETES MELLITUS WITHOUT COMPLICATIONS: ICD-10-CM

## 2020-08-13 DIAGNOSIS — D72.829 ELEVATED WHITE BLOOD CELL COUNT, UNSPECIFIED: ICD-10-CM

## 2020-08-13 DIAGNOSIS — M54.2 CERVICALGIA: ICD-10-CM

## 2020-08-13 DIAGNOSIS — Z79.1 LONG TERM (CURRENT) USE OF NON-STEROIDAL ANTI-INFLAMMATORIES (NSAID): ICD-10-CM

## 2020-08-13 DIAGNOSIS — Z79.4 LONG TERM (CURRENT) USE OF INSULIN: ICD-10-CM

## 2020-08-13 DIAGNOSIS — D86.9 SARCOIDOSIS, UNSPECIFIED: ICD-10-CM

## 2020-08-13 DIAGNOSIS — M50.20 OTHER CERVICAL DISC DISPLACEMENT, UNSPECIFIED CERVICAL REGION: ICD-10-CM

## 2020-08-13 LAB
ALBUMIN SERPL ELPH-MCNC: 3.2 G/DL — LOW (ref 3.3–5)
ALP SERPL-CCNC: 83 U/L — SIGNIFICANT CHANGE UP (ref 40–120)
ALT FLD-CCNC: 14 U/L — SIGNIFICANT CHANGE UP (ref 12–78)
ANION GAP SERPL CALC-SCNC: 6 MMOL/L — SIGNIFICANT CHANGE UP (ref 5–17)
AST SERPL-CCNC: 14 U/L — LOW (ref 15–37)
BILIRUB SERPL-MCNC: 0.2 MG/DL — SIGNIFICANT CHANGE UP (ref 0.2–1.2)
BUN SERPL-MCNC: 12 MG/DL — SIGNIFICANT CHANGE UP (ref 7–23)
CALCIUM SERPL-MCNC: 8.3 MG/DL — LOW (ref 8.5–10.1)
CHLORIDE SERPL-SCNC: 101 MMOL/L — SIGNIFICANT CHANGE UP (ref 96–108)
CO2 SERPL-SCNC: 26 MMOL/L — SIGNIFICANT CHANGE UP (ref 22–31)
CREAT SERPL-MCNC: 0.87 MG/DL — SIGNIFICANT CHANGE UP (ref 0.5–1.3)
GLUCOSE SERPL-MCNC: 178 MG/DL — HIGH (ref 70–99)
HCG SERPL-ACNC: <1 MIU/ML — SIGNIFICANT CHANGE UP
HCT VFR BLD CALC: 33.6 % — LOW (ref 34.5–45)
HGB BLD-MCNC: 10.3 G/DL — LOW (ref 11.5–15.5)
INR BLD: 1.17 RATIO — HIGH (ref 0.88–1.16)
MCHC RBC-ENTMCNC: 24 PG — LOW (ref 27–34)
MCHC RBC-ENTMCNC: 30.7 GM/DL — LOW (ref 32–36)
MCV RBC AUTO: 78.1 FL — LOW (ref 80–100)
NRBC # BLD: 0 /100 WBCS — SIGNIFICANT CHANGE UP (ref 0–0)
PLATELET # BLD AUTO: 599 K/UL — HIGH (ref 150–400)
POTASSIUM SERPL-MCNC: 3.8 MMOL/L — SIGNIFICANT CHANGE UP (ref 3.5–5.3)
POTASSIUM SERPL-SCNC: 3.8 MMOL/L — SIGNIFICANT CHANGE UP (ref 3.5–5.3)
PROT SERPL-MCNC: 8.6 GM/DL — HIGH (ref 6–8.3)
PROTHROM AB SERPL-ACNC: 13.5 SEC — SIGNIFICANT CHANGE UP (ref 10.6–13.6)
RBC # BLD: 4.3 M/UL — SIGNIFICANT CHANGE UP (ref 3.8–5.2)
RBC # FLD: 15.5 % — HIGH (ref 10.3–14.5)
SODIUM SERPL-SCNC: 133 MMOL/L — LOW (ref 135–145)
WBC # BLD: 16.16 K/UL — HIGH (ref 3.8–10.5)
WBC # FLD AUTO: 16.16 K/UL — HIGH (ref 3.8–10.5)

## 2020-08-13 PROCEDURE — 70491 CT SOFT TISSUE NECK W/DYE: CPT | Mod: 26

## 2020-08-13 PROCEDURE — 99285 EMERGENCY DEPT VISIT HI MDM: CPT

## 2020-08-13 RX ORDER — KETOROLAC TROMETHAMINE 30 MG/ML
30 SYRINGE (ML) INJECTION ONCE
Refills: 0 | Status: DISCONTINUED | OUTPATIENT
Start: 2020-08-13 | End: 2020-08-13

## 2020-08-13 RX ORDER — CYCLOBENZAPRINE HYDROCHLORIDE 10 MG/1
5 TABLET, FILM COATED ORAL ONCE
Refills: 0 | Status: COMPLETED | OUTPATIENT
Start: 2020-08-13 | End: 2020-08-13

## 2020-08-13 RX ORDER — SODIUM CHLORIDE 9 MG/ML
1000 INJECTION INTRAMUSCULAR; INTRAVENOUS; SUBCUTANEOUS ONCE
Refills: 0 | Status: COMPLETED | OUTPATIENT
Start: 2020-08-13 | End: 2020-08-13

## 2020-08-13 RX ORDER — CYCLOBENZAPRINE HYDROCHLORIDE 10 MG/1
1 TABLET, FILM COATED ORAL
Qty: 20 | Refills: 0
Start: 2020-08-13 | End: 2020-08-22

## 2020-08-13 RX ORDER — AMPICILLIN SODIUM AND SULBACTAM SODIUM 250; 125 MG/ML; MG/ML
3 INJECTION, POWDER, FOR SUSPENSION INTRAMUSCULAR; INTRAVENOUS ONCE
Refills: 0 | Status: COMPLETED | OUTPATIENT
Start: 2020-08-13 | End: 2020-08-13

## 2020-08-13 RX ORDER — IBUPROFEN 200 MG
1 TABLET ORAL
Qty: 15 | Refills: 0
Start: 2020-08-13 | End: 2020-08-17

## 2020-08-13 RX ADMIN — Medication 30 MILLIGRAM(S): at 18:33

## 2020-08-13 RX ADMIN — AMPICILLIN SODIUM AND SULBACTAM SODIUM 200 GRAM(S): 250; 125 INJECTION, POWDER, FOR SUSPENSION INTRAMUSCULAR; INTRAVENOUS at 18:32

## 2020-08-13 RX ADMIN — SODIUM CHLORIDE 1000 MILLILITER(S): 9 INJECTION INTRAMUSCULAR; INTRAVENOUS; SUBCUTANEOUS at 18:19

## 2020-08-13 RX ADMIN — Medication 30 MILLIGRAM(S): at 18:19

## 2020-08-13 RX ADMIN — CYCLOBENZAPRINE HYDROCHLORIDE 5 MILLIGRAM(S): 10 TABLET, FILM COATED ORAL at 19:39

## 2020-08-13 NOTE — ED PROVIDER NOTE - CLINICAL SUMMARY MEDICAL DECISION MAKING FREE TEXT BOX
rest, ice pain meds and f/u orthropedic in 1 week  Discussed results and outcome of testing with the patient.  Patient advised to please follow up with their primary care doctor within the next 24-48 hours and return to the Emergency Department for worsening symptoms or any other concerns.  Patient advised that their doctor may call  to follow up on the specific results of the tests performed today in the emergency department.

## 2020-08-13 NOTE — ED ADULT NURSE NOTE - SUICIDE SCREENING DEPRESSION
Electrodesiccation Text: The wound bed was treated with electrodesiccation after the biopsy was performed. Destruction After The Procedure: No Additional Anesthesia Volume In Cc (Will Not Render If 0): 0 Curettage Text: The wound bed was treated with curettage after the biopsy was performed. Post-Care Instructions: I reviewed with the patient in detail post-care instructions. Patient is to keep the biopsy site dry overnight, and then apply bacitracin twice daily until healed. Patient may apply hydrogen peroxide soaks to remove any crusting. Type Of Destruction Used: Electrodesiccation Lab: 253 Silver Nitrate Text: The wound bed was treated with silver nitrate after the biopsy was performed. Detail Level: Detailed Cryotherapy Text: The wound bed was treated with cryotherapy after the biopsy was performed. Body Location Override (Optional - Billing Will Still Be Based On Selected Body Map Location If Applicable): medial left dorsal arm Wound Care: Vaseline Biopsy Type: H and E Biopsy Method: Dermablade Hemostasis: Electrocautery Lab Facility:  Consent: Written consent was obtained and risks were reviewed including but not limited to scarring, infection, bleeding, scabbing, incomplete removal, nerve damage and allergy to anesthesia. Anesthesia Type: 1% lidocaine with epinephrine Billing Type: Third-Party Bill Electrodesiccation And Curettage Text: The wound bed was treated with electrodesiccation and curettage after the biopsy was performed. Anesthesia Volume In Cc: 0.5 Dressing: bandage Notification Instructions: Patient will be notified of biopsy results. However, patient instructed to call the office if not contacted within 2 weeks. Body Location Override (Optional - Billing Will Still Be Based On Selected Body Map Location If Applicable): left lateral forearm Negative

## 2020-08-13 NOTE — ED ADULT NURSE REASSESSMENT NOTE - NS ED NURSE REASSESS COMMENT FT1
patient feeling better after medication, patient able to swallow full bottle of water. requesting oral meds

## 2020-08-13 NOTE — ED PROVIDER NOTE - OBJECTIVE STATEMENT
this is a 39 yo f w apmhhx of right lateral neck pain x 2 day. Denies nausea, vomiting, fever, sob, diarrhea, headache, sob. Pain worst w moevemnt

## 2020-08-13 NOTE — ED PROVIDER NOTE - PATIENT PORTAL LINK FT
You can access the FollowMyHealth Patient Portal offered by [facility] by registering at the following website: [email or web address]. By joining [employer]’s FollowMyHealth portal, you will also be able to view your health information using other applications (apps) compatible with our system.

## 2020-08-13 NOTE — ED ADULT NURSE NOTE - OBJECTIVE STATEMENT
patient received c/o 10/10 neck pain since Tuesday. unable to turn head, patient is unable to swallow. patient attempted to swallow today at 12pn and was unsuccessful patient states she cannot even swallow her saliva

## 2020-08-13 NOTE — ED ADULT NURSE NOTE - NSIMPLEMENTINTERV_GEN_ALL_ED
Implemented All Universal Safety Interventions:  [place] to call system. Call bell, personal items and telephone within reach. Instruct patient to call for assistance. Room bathroom lighting operational. Non-slip footwear when patient is off stretcher. Physically safe environment: no spills, clutter or unnecessary equipment. Stretcher in lowest position, wheels locked, appropriate side rails in place.

## 2020-08-14 NOTE — ED POST DISCHARGE NOTE - DETAILS
pt states she is abit better. Has appt with ortho on tuesday. encouraged pt to follow up with her primary MD regarding blood work. states she will .

## 2020-08-18 ENCOUNTER — EMERGENCY (EMERGENCY)
Facility: HOSPITAL | Age: 40
LOS: 0 days | Discharge: HOME | End: 2020-08-18
Attending: EMERGENCY MEDICINE
Payer: COMMERCIAL

## 2020-08-18 VITALS
HEIGHT: 71 IN | OXYGEN SATURATION: 98 % | SYSTOLIC BLOOD PRESSURE: 107 MMHG | WEIGHT: 210.1 LBS | TEMPERATURE: 99 F | RESPIRATION RATE: 17 BRPM | DIASTOLIC BLOOD PRESSURE: 81 MMHG | HEART RATE: 98 BPM

## 2020-08-18 VITALS
TEMPERATURE: 98 F | RESPIRATION RATE: 17 BRPM | SYSTOLIC BLOOD PRESSURE: 127 MMHG | DIASTOLIC BLOOD PRESSURE: 77 MMHG | HEART RATE: 84 BPM | OXYGEN SATURATION: 100 %

## 2020-08-18 DIAGNOSIS — E11.9 TYPE 2 DIABETES MELLITUS WITHOUT COMPLICATIONS: ICD-10-CM

## 2020-08-18 DIAGNOSIS — M54.2 CERVICALGIA: ICD-10-CM

## 2020-08-18 DIAGNOSIS — J02.9 ACUTE PHARYNGITIS, UNSPECIFIED: ICD-10-CM

## 2020-08-18 DIAGNOSIS — D86.9 SARCOIDOSIS, UNSPECIFIED: ICD-10-CM

## 2020-08-18 DIAGNOSIS — R51 HEADACHE: ICD-10-CM

## 2020-08-18 DIAGNOSIS — Z11.59 ENCOUNTER FOR SCREENING FOR OTHER VIRAL DISEASES: ICD-10-CM

## 2020-08-18 DIAGNOSIS — Z79.4 LONG TERM (CURRENT) USE OF INSULIN: ICD-10-CM

## 2020-08-18 DIAGNOSIS — R07.0 PAIN IN THROAT: ICD-10-CM

## 2020-08-18 DIAGNOSIS — M50.20 OTHER CERVICAL DISC DISPLACEMENT, UNSPECIFIED CERVICAL REGION: ICD-10-CM

## 2020-08-18 LAB
ALBUMIN SERPL ELPH-MCNC: 3.2 G/DL — LOW (ref 3.3–5)
ALP SERPL-CCNC: 76 U/L — SIGNIFICANT CHANGE UP (ref 40–120)
ALT FLD-CCNC: 10 U/L — LOW (ref 12–78)
ANION GAP SERPL CALC-SCNC: 9 MMOL/L — SIGNIFICANT CHANGE UP (ref 5–17)
ANISOCYTOSIS BLD QL: SLIGHT — SIGNIFICANT CHANGE UP
APPEARANCE UR: CLEAR — SIGNIFICANT CHANGE UP
AST SERPL-CCNC: 10 U/L — LOW (ref 15–37)
BASOPHILS # BLD AUTO: 0 K/UL — SIGNIFICANT CHANGE UP (ref 0–0.2)
BASOPHILS NFR BLD AUTO: 0 % — SIGNIFICANT CHANGE UP (ref 0–2)
BILIRUB SERPL-MCNC: 0.2 MG/DL — SIGNIFICANT CHANGE UP (ref 0.2–1.2)
BILIRUB UR-MCNC: NEGATIVE — SIGNIFICANT CHANGE UP
BUN SERPL-MCNC: 12 MG/DL — SIGNIFICANT CHANGE UP (ref 7–23)
CALCIUM SERPL-MCNC: 8.6 MG/DL — SIGNIFICANT CHANGE UP (ref 8.5–10.1)
CHLORIDE SERPL-SCNC: 103 MMOL/L — SIGNIFICANT CHANGE UP (ref 96–108)
CO2 SERPL-SCNC: 27 MMOL/L — SIGNIFICANT CHANGE UP (ref 22–31)
COLOR SPEC: YELLOW — SIGNIFICANT CHANGE UP
CREAT SERPL-MCNC: 0.81 MG/DL — SIGNIFICANT CHANGE UP (ref 0.5–1.3)
DIFF PNL FLD: NEGATIVE — SIGNIFICANT CHANGE UP
EOSINOPHIL # BLD AUTO: 0.25 K/UL — SIGNIFICANT CHANGE UP (ref 0–0.5)
EOSINOPHIL NFR BLD AUTO: 2 % — SIGNIFICANT CHANGE UP (ref 0–6)
GLUCOSE BLDC GLUCOMTR-MCNC: 103 MG/DL — HIGH (ref 70–99)
GLUCOSE BLDC GLUCOMTR-MCNC: 48 MG/DL — LOW (ref 70–99)
GLUCOSE BLDC GLUCOMTR-MCNC: 48 MG/DL — LOW (ref 70–99)
GLUCOSE BLDC GLUCOMTR-MCNC: 49 MG/DL — LOW (ref 70–99)
GLUCOSE BLDC GLUCOMTR-MCNC: 66 MG/DL — LOW (ref 70–99)
GLUCOSE BLDC GLUCOMTR-MCNC: 84 MG/DL — SIGNIFICANT CHANGE UP (ref 70–99)
GLUCOSE SERPL-MCNC: 61 MG/DL — LOW (ref 70–99)
GLUCOSE UR QL: NEGATIVE MG/DL — SIGNIFICANT CHANGE UP
HCG SERPL-ACNC: <1 MIU/ML — SIGNIFICANT CHANGE UP
HCT VFR BLD CALC: 33.7 % — LOW (ref 34.5–45)
HGB BLD-MCNC: 10.2 G/DL — LOW (ref 11.5–15.5)
HYPOCHROMIA BLD QL: SLIGHT — SIGNIFICANT CHANGE UP
KETONES UR-MCNC: NEGATIVE — SIGNIFICANT CHANGE UP
LEUKOCYTE ESTERASE UR-ACNC: NEGATIVE — SIGNIFICANT CHANGE UP
LG PLATELETS BLD QL AUTO: SLIGHT — SIGNIFICANT CHANGE UP
LYMPHOCYTES # BLD AUTO: 29 % — SIGNIFICANT CHANGE UP (ref 13–44)
LYMPHOCYTES # BLD AUTO: 3.69 K/UL — HIGH (ref 1–3.3)
MANUAL SMEAR VERIFICATION: SIGNIFICANT CHANGE UP
MCHC RBC-ENTMCNC: 24 PG — LOW (ref 27–34)
MCHC RBC-ENTMCNC: 30.3 GM/DL — LOW (ref 32–36)
MCV RBC AUTO: 79.3 FL — LOW (ref 80–100)
MICROCYTES BLD QL: SIGNIFICANT CHANGE UP
MONOCYTES # BLD AUTO: 0.38 K/UL — SIGNIFICANT CHANGE UP (ref 0–0.9)
MONOCYTES NFR BLD AUTO: 3 % — SIGNIFICANT CHANGE UP (ref 2–14)
NEUTROPHILS # BLD AUTO: 7.63 K/UL — HIGH (ref 1.8–7.4)
NEUTROPHILS NFR BLD AUTO: 60 % — SIGNIFICANT CHANGE UP (ref 43–77)
NITRITE UR-MCNC: NEGATIVE — SIGNIFICANT CHANGE UP
NRBC # BLD: 0 /100 — SIGNIFICANT CHANGE UP (ref 0–0)
NRBC # BLD: SIGNIFICANT CHANGE UP /100 WBCS (ref 0–0)
OVALOCYTES BLD QL SMEAR: SLIGHT — SIGNIFICANT CHANGE UP
PH UR: 7 — SIGNIFICANT CHANGE UP (ref 5–8)
PLAT MORPH BLD: ABNORMAL
PLATELET # BLD AUTO: 639 K/UL — HIGH (ref 150–400)
POLYCHROMASIA BLD QL SMEAR: SLIGHT — SIGNIFICANT CHANGE UP
POTASSIUM SERPL-MCNC: 3.7 MMOL/L — SIGNIFICANT CHANGE UP (ref 3.5–5.3)
POTASSIUM SERPL-SCNC: 3.7 MMOL/L — SIGNIFICANT CHANGE UP (ref 3.5–5.3)
PROT SERPL-MCNC: 8 GM/DL — SIGNIFICANT CHANGE UP (ref 6–8.3)
PROT UR-MCNC: NEGATIVE MG/DL — SIGNIFICANT CHANGE UP
RBC # BLD: 4.25 M/UL — SIGNIFICANT CHANGE UP (ref 3.8–5.2)
RBC # FLD: 15.7 % — HIGH (ref 10.3–14.5)
RBC BLD AUTO: ABNORMAL
SODIUM SERPL-SCNC: 139 MMOL/L — SIGNIFICANT CHANGE UP (ref 135–145)
SP GR SPEC: 1.01 — SIGNIFICANT CHANGE UP (ref 1.01–1.02)
UROBILINOGEN FLD QL: NEGATIVE MG/DL — SIGNIFICANT CHANGE UP
VARIANT LYMPHS # BLD: 6 % — SIGNIFICANT CHANGE UP (ref 0–6)
WBC # BLD: 12.71 K/UL — HIGH (ref 3.8–10.5)
WBC # FLD AUTO: 12.71 K/UL — HIGH (ref 3.8–10.5)

## 2020-08-18 PROCEDURE — 72156 MRI NECK SPINE W/O & W/DYE: CPT | Mod: 26

## 2020-08-18 PROCEDURE — 99285 EMERGENCY DEPT VISIT HI MDM: CPT

## 2020-08-18 RX ORDER — CYCLOBENZAPRINE HYDROCHLORIDE 10 MG/1
1 TABLET, FILM COATED ORAL
Qty: 21 | Refills: 0
Start: 2020-08-18 | End: 2020-08-24

## 2020-08-18 RX ORDER — SIMVASTATIN 20 MG/1
1 TABLET, FILM COATED ORAL
Qty: 0 | Refills: 0 | DISCHARGE

## 2020-08-18 RX ORDER — INSULIN GLARGINE 100 [IU]/ML
40 INJECTION, SOLUTION SUBCUTANEOUS
Qty: 0 | Refills: 0 | DISCHARGE

## 2020-08-18 RX ORDER — KETOROLAC TROMETHAMINE 30 MG/ML
30 SYRINGE (ML) INJECTION ONCE
Refills: 0 | Status: DISCONTINUED | OUTPATIENT
Start: 2020-08-18 | End: 2020-08-18

## 2020-08-18 RX ORDER — IBUPROFEN 200 MG
600 TABLET ORAL ONCE
Refills: 0 | Status: DISCONTINUED | OUTPATIENT
Start: 2020-08-18 | End: 2020-08-18

## 2020-08-18 RX ORDER — IBUPROFEN 200 MG
1 TABLET ORAL
Qty: 15 | Refills: 0
Start: 2020-08-18 | End: 2020-08-22

## 2020-08-18 RX ORDER — DEXAMETHASONE 0.5 MG/5ML
6 ELIXIR ORAL ONCE
Refills: 0 | Status: DISCONTINUED | OUTPATIENT
Start: 2020-08-18 | End: 2020-08-18

## 2020-08-18 RX ORDER — CYCLOBENZAPRINE HYDROCHLORIDE 10 MG/1
10 TABLET, FILM COATED ORAL ONCE
Refills: 0 | Status: COMPLETED | OUTPATIENT
Start: 2020-08-18 | End: 2020-08-18

## 2020-08-18 RX ORDER — LIRAGLUTIDE 6 MG/ML
60 INJECTION SUBCUTANEOUS
Qty: 0 | Refills: 0 | DISCHARGE

## 2020-08-18 RX ORDER — AMPICILLIN SODIUM AND SULBACTAM SODIUM 250; 125 MG/ML; MG/ML
3 INJECTION, POWDER, FOR SUSPENSION INTRAMUSCULAR; INTRAVENOUS ONCE
Refills: 0 | Status: COMPLETED | OUTPATIENT
Start: 2020-08-18 | End: 2020-08-18

## 2020-08-18 RX ORDER — DEXTROSE 50 % IN WATER 50 %
50 SYRINGE (ML) INTRAVENOUS ONCE
Refills: 0 | Status: COMPLETED | OUTPATIENT
Start: 2020-08-18 | End: 2020-08-18

## 2020-08-18 RX ADMIN — Medication 50 MILLILITER(S): at 10:01

## 2020-08-18 RX ADMIN — AMPICILLIN SODIUM AND SULBACTAM SODIUM 3 GRAM(S): 250; 125 INJECTION, POWDER, FOR SUSPENSION INTRAMUSCULAR; INTRAVENOUS at 10:39

## 2020-08-18 RX ADMIN — CYCLOBENZAPRINE HYDROCHLORIDE 10 MILLIGRAM(S): 10 TABLET, FILM COATED ORAL at 18:46

## 2020-08-18 RX ADMIN — AMPICILLIN SODIUM AND SULBACTAM SODIUM 200 GRAM(S): 250; 125 INJECTION, POWDER, FOR SUSPENSION INTRAMUSCULAR; INTRAVENOUS at 10:06

## 2020-08-18 NOTE — ED PROVIDER NOTE - PHYSICAL EXAMINATION
Gen: Alert, Well appearing. NAD    Head: NC, AT, PERRL, normal lids/conjunctiva   ENT: Bilateral TM WNL, patent oropharynx with +++ exudate on rt tonsil, uvula midline, no significant asymmetry between rt and left tonsils.   Neck: supple, no tenderness/meningismus  Pulm: Bilateral clear BS, normal resp effort  CV: RRR, no M/R/G, +dist pulses   Abd: soft, NT/ND, +BS, no guarding/rebound tenderness  Mskel: extremities x4 with normal ROM. no ctl spine ttp. no edema/erythema/cyanosis. + left lateral neck pain, pain with lateral rotation to rt and limited.  reports base of occipital on rt as area of headache  Skin: no rash, no bruising  Neuro: AAOx3, no sensory/motor deficits, CN 2-12 intact

## 2020-08-18 NOTE — ED PROVIDER NOTE - NSFOLLOWUPINSTRUCTIONS_ED_ALL_ED_FT
Follow up with your primary care doctor within the next 24-48 hours and bring copy of your results.  Return to the Emergency Department for worsening or persistent symptoms or any other concerns incl. persistent fever, unable to tolerate oral intake, worsening sore throat, weakness/numbness to the upper arms, chest pain, shortness of breath, dizziness, inability to tolerate oral intake.  Rest, drink plenty of fluids.  Advance activity as tolerated.  Continue all previously prescribed medications as directed. You can use motrin 800mg every 6-8 hours for pain or fever, and/or Tylenol 650 mg every 4 hours for pain/fever (Max   4g/day of tylenol, but try to stay under 3.5g/day.)     You were prescribed a muscle relaxant    please take augmentin (antibiotic) to completion.

## 2020-08-18 NOTE — ED ADULT NURSE NOTE - OBJECTIVE STATEMENT
Received pt from triage lying on stretcher pt came in c/o selling and difficulty swallowing to the Rt side of the throat since on Tuesday accompanied with HA, chills and dizziness denies any nausea vomiting or photosensitivity, seen here last week Dx with hernia disc c3-c4. sent by Dr Almanza for evaluation, pt hx of DM at triage pt found to be hypoglycemia( See  results) seen and evaluated by Dr Mc ( OJ given with breakfast). all labs sent , pending MRI for dispo.

## 2020-08-18 NOTE — CONSULT NOTE ADULT - ASSESSMENT
40F Herniated disc C3-C6 with unrelated pharyngitis     -Medical management per medical team  -Cervical herniated discs likely unrelated to pharyngitis  -Recommend medical management for pharyngitis   -Recommend flexaril for Cervical paraspinal tenderness  -WBAT/PT  -DVT PPx per primary medical team   -Follow up with Dr. Bravo regarding C spine herniation   -No acute orthopedic surgical intervention needed at this time  -Will discuss with attending and advise if plan changes   -Ortho stable for discharge    ******INCOMPLETE NOTE IN PROGRESS******  ******INCOMPLETE NOTE IN PROGRESS******  ******INCOMPLETE NOTE IN PROGRESS****** 40F Herniated disc C3-C6 with unrelated pharyngitis     -Medical management per medical team  -Cervical herniated discs likely unrelated to pharyngitis  -Recommend medical management for pharyngitis   -Recommend flexaril/gabapentin for Cervical paraspinal tenderness  -Recommend holding steroids for possible pharyngitis infection   -WBAT/PT  -DVT PPx per primary medical team   -Follow up with Dr. Bravo regarding C spine herniation   -No acute orthopedic surgical intervention needed at this time  -Will discuss with attending and advise if plan changes   -Ortho stable for discharge    ******INCOMPLETE NOTE IN PROGRESS******  ******INCOMPLETE NOTE IN PROGRESS******  ******INCOMPLETE NOTE IN PROGRESS****** 40F Herniated disc C3-C6 with unrelated pharyngitis     -Cervical herniated discs likely unrelated to pharyngitis  -Recommend medical management for pharyngitis   -Recommend flexaril/gabapentin for Cervical paraspinal tenderness  -Recommend holding steroids for possible pharyngitis infection   -WBAT/PT  -DVT PPx per primary medical team   -Follow up with Dr. Bravo regarding C spine herniation   -No acute orthopedic surgical intervention needed at this time  -Will discuss with attending and advise if plan changes   -Ortho stable for discharge

## 2020-08-18 NOTE — ED ADULT TRIAGE NOTE - CHIEF COMPLAINT QUOTE
c/o neck painsent in by dr hicks for mri also feels irritation in throat and headache with diffficulty swallowing

## 2020-08-18 NOTE — ED PROVIDER NOTE - OBJECTIVE STATEMENT
41yo female with pmh DM on insulin, sarcoid presents with rt neck pain with pain on rt lateral movement x 5 days. Pt was seen in ER, had CTA neck, WCC 16, showing (Calcification of the longus coli tendon with surrounding retropharyngeal and prevertebral fluid from C1 through C5, most suggestive of acute tendinitis of the longus colli tendon. Large right paracentral disc bulge at C3/C4 resulting in mild impingement on the ventral cord and mild to moderate spinal canal stenosis. Posterior osseous ridging at C5 and C6 resulting in mild impingement on the ventral thecal sac and mild spinal canal stenosis. Hypertrophy of lingual tonsils). Of note, after pt was seen had fever and sore throat. Pain also in rt occipital head. no dizziness, fever, NV, cp, photophobia.  Pt had appt with dr hicks today, but when they called last night to ask about fever & headache, she reported yes. They stated it was too late for MRI yesterday and to go to Er for MRI this am.     + fever, no chills, No photophobia/eye pain/changes in vision, No ear pain/+sore throat/dysphagia, No chest pain/palpitations, no SOB/cough, no wheeze/stridor, No abdominal pain, No N/V/D, no dysuria/frequency/discharge, + neck, no back pain, no rash, no changes in neurological status/function.

## 2020-08-18 NOTE — ED PROVIDER NOTE - PATIENT PORTAL LINK FT
You can access the FollowMyHealth Patient Portal offered by [facility] by registering at the following website: [email or web address]. By joining [employer]’s FollowMyHealth portal, you will also be able to view your health information using other applications (apps) compatible with our system.

## 2020-08-18 NOTE — ED PROVIDER NOTE - CLINICAL SUMMARY MEDICAL DECISION MAKING FREE TEXT BOX
pt with obv pharyngitis, will need abx. Unclear if retropharyngeal fluid noted prior is ? infectious given fever and WCC of 16. will give abx, culture throat and MRI and consult ortho. pt with obv pharyngitis, will need abx. Unclear if retropharyngeal fluid noted prior is ? infectious given fever and WCC of 16. will give abx, culture throat and MRI and consult ortho.    MRI noted. no abscess. ortho eval, not for ortho currently, will fu with dr hicks. pt given stric precautions for return.

## 2020-08-18 NOTE — ED PROVIDER NOTE - CARE PROVIDER_API CALL
Nicole Bravo S  ORTHOPAEDIC SURGERY  [address]  [place] [address]  Phone: [phone]  Fax: [phone]  Follow Up Time:

## 2020-08-18 NOTE — ED PROVIDER NOTE - PROGRESS NOTE DETAILS
after walking away from pt, she started to get diaphoretic and weak. FS 49. PT reports gave herself insulin pta, but did not eat. LAst ate last night. Pt given juice, crackers and food. and will reassess.

## 2020-08-19 LAB — SARS-COV-2 RNA SPEC QL NAA+PROBE: SIGNIFICANT CHANGE UP

## 2020-08-20 LAB
CULTURE RESULTS: SIGNIFICANT CHANGE UP
CULTURE RESULTS: SIGNIFICANT CHANGE UP
SPECIMEN SOURCE: SIGNIFICANT CHANGE UP
SPECIMEN SOURCE: SIGNIFICANT CHANGE UP

## 2020-08-21 NOTE — ED POST DISCHARGE NOTE - DETAILS
feeling better. instructed to continue antibx as prescribed and follow with with PCP.. state will FU with ortho after antibx complete.

## 2020-09-10 PROBLEM — E78.5 HYPERLIPIDEMIA, UNSPECIFIED: Chronic | Status: ACTIVE | Noted: 2020-08-18

## 2020-12-07 ENCOUNTER — APPOINTMENT (OUTPATIENT)
Dept: ENDOCRINOLOGY | Facility: CLINIC | Age: 40
End: 2020-12-07
Payer: MEDICARE

## 2020-12-07 PROCEDURE — 99443: CPT

## 2020-12-07 NOTE — ASSESSMENT
[FreeTextEntry1] : - compliance is reiterated\par - needs fasting blood work\par - given new plans for pregnancy, stop Ozempic\par - Start Levemir 20 units HS, Novolog 16-20- 15\par - advised on MFM during pregnancy\par - monitor lipids, blood pressure\par - ophthalmology evaluation\par RTC to the office for a proper evaluation \par

## 2020-12-07 NOTE — HISTORY OF PRESENT ILLNESS
[Home] : at home, [unfilled] , at the time of the visit. [Medical Office: ([facility])___] : at the medical office located in  [Verbal consent obtained from patient] : the patient, [unfilled] [FreeTextEntry1] : F/u for diabetes management\par \par *** Phone visit  Dec 07, 2020 ***\par \par started fertility evaluation. stopped actos and Tresiba\par taking Novolog 20 un tid ac, Ozempic 0.25 mg qw\par reports fbs - 110's, ppg- in mid 200's\par \par no recent labs\par \par HISTORY OF PRESENT ILLNESS. \par \par Ms. TAPIA was diagnosed with Diabetes Mellitus Type 2 in 2014 \par Reports history HTN, dyslipidemia, sarcoidosis. She denies CAD,  known complications of retinopathy, nephropathy, or neuropathy. \par She reports a GI intolerance on metformin, and is presently on Basaglar 20 un HS, Novolog 16-0-16. She was briefly on Victoza  (using samples) with some help, but is not taking right now. She was previously under care of Dr. Membreno.\par Blood sugars are checked 2-3 times a day. She reports FBS in low 200's, ppg- 240's- 260's\par Hypoglycemia frequency: none\par Diet: not following ADA\par Exercise: none\par \par Last dilated eye - 2019\par Last podiatry visit  - 2019\par Last cardiology evaluation - none\par Last stress test - none\par Last 2-D Echo - does not recall\par Last nephrology evaluation - none\par Last neurology evaluation- none\par \par

## 2020-12-28 ENCOUNTER — EMERGENCY (EMERGENCY)
Facility: HOSPITAL | Age: 40
LOS: 0 days | Discharge: HOME | End: 2020-12-28
Attending: STUDENT IN AN ORGANIZED HEALTH CARE EDUCATION/TRAINING PROGRAM
Payer: COMMERCIAL

## 2020-12-28 VITALS
WEIGHT: 207.01 LBS | HEART RATE: 96 BPM | TEMPERATURE: 99 F | DIASTOLIC BLOOD PRESSURE: 73 MMHG | HEIGHT: 71 IN | RESPIRATION RATE: 17 BRPM | SYSTOLIC BLOOD PRESSURE: 114 MMHG | OXYGEN SATURATION: 100 %

## 2020-12-28 DIAGNOSIS — R10.32 LEFT LOWER QUADRANT PAIN: ICD-10-CM

## 2020-12-28 DIAGNOSIS — O34.11 MATERNAL CARE FOR BENIGN TUMOR OF CORPUS UTERI, FIRST TRIMESTER: ICD-10-CM

## 2020-12-28 DIAGNOSIS — O26.851 SPOTTING COMPLICATING PREGNANCY, FIRST TRIMESTER: ICD-10-CM

## 2020-12-28 DIAGNOSIS — Z79.4 LONG TERM (CURRENT) USE OF INSULIN: ICD-10-CM

## 2020-12-28 DIAGNOSIS — Z3A.01 LESS THAN 8 WEEKS GESTATION OF PREGNANCY: ICD-10-CM

## 2020-12-28 DIAGNOSIS — O24.111 PRE-EXISTING TYPE 2 DIABETES MELLITUS, IN PREGNANCY, FIRST TRIMESTER: ICD-10-CM

## 2020-12-28 DIAGNOSIS — O99.891 OTHER SPECIFIED DISEASES AND CONDITIONS COMPLICATING PREGNANCY: ICD-10-CM

## 2020-12-28 DIAGNOSIS — O99.111 OTHER DISEASES OF THE BLOOD AND BLOOD-FORMING ORGANS AND CERTAIN DISORDERS INVOLVING THE IMMUNE MECHANISM COMPLICATING PREGNANCY, FIRST TRIMESTER: ICD-10-CM

## 2020-12-28 DIAGNOSIS — E78.5 HYPERLIPIDEMIA, UNSPECIFIED: ICD-10-CM

## 2020-12-28 DIAGNOSIS — O99.281 ENDOCRINE, NUTRITIONAL AND METABOLIC DISEASES COMPLICATING PREGNANCY, FIRST TRIMESTER: ICD-10-CM

## 2020-12-28 DIAGNOSIS — O23.41 UNSPECIFIED INFECTION OF URINARY TRACT IN PREGNANCY, FIRST TRIMESTER: ICD-10-CM

## 2020-12-28 DIAGNOSIS — D25.9 LEIOMYOMA OF UTERUS, UNSPECIFIED: ICD-10-CM

## 2020-12-28 DIAGNOSIS — D86.9 SARCOIDOSIS, UNSPECIFIED: ICD-10-CM

## 2020-12-28 LAB
ALBUMIN SERPL ELPH-MCNC: 3.2 G/DL — LOW (ref 3.3–5)
ALP SERPL-CCNC: 84 U/L — SIGNIFICANT CHANGE UP (ref 40–120)
ALT FLD-CCNC: 20 U/L — SIGNIFICANT CHANGE UP (ref 12–78)
AMYLASE P1 CFR SERPL: 35 U/L — SIGNIFICANT CHANGE UP (ref 25–115)
ANION GAP SERPL CALC-SCNC: 6 MMOL/L — SIGNIFICANT CHANGE UP (ref 5–17)
APPEARANCE UR: ABNORMAL
AST SERPL-CCNC: 26 U/L — SIGNIFICANT CHANGE UP (ref 15–37)
BACTERIA # UR AUTO: ABNORMAL
BASOPHILS # BLD AUTO: 0.03 K/UL — SIGNIFICANT CHANGE UP (ref 0–0.2)
BASOPHILS NFR BLD AUTO: 0.3 % — SIGNIFICANT CHANGE UP (ref 0–2)
BILIRUB SERPL-MCNC: 0.3 MG/DL — SIGNIFICANT CHANGE UP (ref 0.2–1.2)
BILIRUB UR-MCNC: NEGATIVE — SIGNIFICANT CHANGE UP
BUN SERPL-MCNC: 9 MG/DL — SIGNIFICANT CHANGE UP (ref 7–23)
CALCIUM SERPL-MCNC: 8.8 MG/DL — SIGNIFICANT CHANGE UP (ref 8.5–10.1)
CHLORIDE SERPL-SCNC: 103 MMOL/L — SIGNIFICANT CHANGE UP (ref 96–108)
CO2 SERPL-SCNC: 25 MMOL/L — SIGNIFICANT CHANGE UP (ref 22–31)
COLOR SPEC: YELLOW — SIGNIFICANT CHANGE UP
CREAT SERPL-MCNC: 0.83 MG/DL — SIGNIFICANT CHANGE UP (ref 0.5–1.3)
DIFF PNL FLD: ABNORMAL
EOSINOPHIL # BLD AUTO: 0.08 K/UL — SIGNIFICANT CHANGE UP (ref 0–0.5)
EOSINOPHIL NFR BLD AUTO: 0.8 % — SIGNIFICANT CHANGE UP (ref 0–6)
EPI CELLS # UR: ABNORMAL
GLUCOSE SERPL-MCNC: 156 MG/DL — HIGH (ref 70–99)
GLUCOSE UR QL: NEGATIVE MG/DL — SIGNIFICANT CHANGE UP
HCG SERPL-ACNC: 780 MIU/ML — HIGH
HCT VFR BLD CALC: 35.8 % — SIGNIFICANT CHANGE UP (ref 34.5–45)
HGB BLD-MCNC: 11.1 G/DL — LOW (ref 11.5–15.5)
IMM GRANULOCYTES NFR BLD AUTO: 0.7 % — SIGNIFICANT CHANGE UP (ref 0–1.5)
KETONES UR-MCNC: NEGATIVE — SIGNIFICANT CHANGE UP
LEUKOCYTE ESTERASE UR-ACNC: NEGATIVE — SIGNIFICANT CHANGE UP
LIDOCAIN IGE QN: 143 U/L — SIGNIFICANT CHANGE UP (ref 73–393)
LYMPHOCYTES # BLD AUTO: 2.54 K/UL — SIGNIFICANT CHANGE UP (ref 1–3.3)
LYMPHOCYTES # BLD AUTO: 24.1 % — SIGNIFICANT CHANGE UP (ref 13–44)
MCHC RBC-ENTMCNC: 25.8 PG — LOW (ref 27–34)
MCHC RBC-ENTMCNC: 31 GM/DL — LOW (ref 32–36)
MCV RBC AUTO: 83.1 FL — SIGNIFICANT CHANGE UP (ref 80–100)
MONOCYTES # BLD AUTO: 0.72 K/UL — SIGNIFICANT CHANGE UP (ref 0–0.9)
MONOCYTES NFR BLD AUTO: 6.8 % — SIGNIFICANT CHANGE UP (ref 2–14)
NEUTROPHILS # BLD AUTO: 7.11 K/UL — SIGNIFICANT CHANGE UP (ref 1.8–7.4)
NEUTROPHILS NFR BLD AUTO: 67.3 % — SIGNIFICANT CHANGE UP (ref 43–77)
NITRITE UR-MCNC: POSITIVE
NRBC # BLD: 0 /100 WBCS — SIGNIFICANT CHANGE UP (ref 0–0)
PH UR: 6 — SIGNIFICANT CHANGE UP (ref 5–8)
PLATELET # BLD AUTO: 507 K/UL — HIGH (ref 150–400)
POTASSIUM SERPL-MCNC: 4.6 MMOL/L — SIGNIFICANT CHANGE UP (ref 3.5–5.3)
POTASSIUM SERPL-SCNC: 4.6 MMOL/L — SIGNIFICANT CHANGE UP (ref 3.5–5.3)
PROT SERPL-MCNC: 8.2 GM/DL — SIGNIFICANT CHANGE UP (ref 6–8.3)
PROT UR-MCNC: NEGATIVE MG/DL — SIGNIFICANT CHANGE UP
RBC # BLD: 4.31 M/UL — SIGNIFICANT CHANGE UP (ref 3.8–5.2)
RBC # FLD: 15.7 % — HIGH (ref 10.3–14.5)
RBC CASTS # UR COMP ASSIST: ABNORMAL /HPF (ref 0–4)
SODIUM SERPL-SCNC: 134 MMOL/L — LOW (ref 135–145)
SP GR SPEC: 1.01 — SIGNIFICANT CHANGE UP (ref 1.01–1.02)
UROBILINOGEN FLD QL: NEGATIVE MG/DL — SIGNIFICANT CHANGE UP
WBC # BLD: 10.55 K/UL — HIGH (ref 3.8–10.5)
WBC # FLD AUTO: 10.55 K/UL — HIGH (ref 3.8–10.5)

## 2020-12-28 PROCEDURE — 76856 US EXAM PELVIC COMPLETE: CPT | Mod: 26

## 2020-12-28 PROCEDURE — 99285 EMERGENCY DEPT VISIT HI MDM: CPT

## 2020-12-28 RX ORDER — AMOXICILLIN 250 MG/5ML
1 SUSPENSION, RECONSTITUTED, ORAL (ML) ORAL
Qty: 14 | Refills: 0
Start: 2020-12-28 | End: 2021-01-03

## 2020-12-28 RX ORDER — SODIUM CHLORIDE 9 MG/ML
1000 INJECTION INTRAMUSCULAR; INTRAVENOUS; SUBCUTANEOUS ONCE
Refills: 0 | Status: COMPLETED | OUTPATIENT
Start: 2020-12-28 | End: 2020-12-28

## 2020-12-28 RX ADMIN — SODIUM CHLORIDE 1000 MILLILITER(S): 9 INJECTION INTRAMUSCULAR; INTRAVENOUS; SUBCUTANEOUS at 07:57

## 2020-12-28 RX ADMIN — SODIUM CHLORIDE 1000 MILLILITER(S): 9 INJECTION INTRAMUSCULAR; INTRAVENOUS; SUBCUTANEOUS at 08:57

## 2020-12-28 NOTE — ED PROVIDER NOTE - PATIENT PORTAL LINK FT
You can access the FollowMyHealth Patient Portal offered by [facility] by registering at the following website: [email or web address]. By joining [employer]’s FollowMyHealth portal, you will also be able to view your health information using other applications (apps) compatible with our system.

## 2020-12-28 NOTE — ED PROVIDER NOTE - CARE PLAN
Principal Discharge DX:	Abdominal pain during pregnancy   Principal Discharge DX:	Abdominal pain during pregnancy  Secondary Diagnosis:	UTI (urinary tract infection) during pregnancy

## 2020-12-28 NOTE — ED PROVIDER NOTE - CPE EDP EYES NORM
normal... Complex Repair And M Plasty Text: The defect edges were debeveled with a #15 scalpel blade.  The primary defect was closed partially with a complex linear closure.  Given the location of the remaining defect, shape of the defect and the proximity to free margins an M plasty was deemed most appropriate for complete closure of the defect.  Using a sterile surgical marker, an appropriate advancement flap was drawn incorporating the defect and placing the expected incisions within the relaxed skin tension lines where possible.    The area thus outlined was incised deep to adipose tissue with a #15 scalpel blade.  The skin margins were undermined to an appropriate distance in all directions utilizing iris scissors.

## 2020-12-28 NOTE — ED ADULT NURSE NOTE - CHPI ED NUR SYMPTOMS NEG
no abdominal distension/no blood in stool/no burning urination/no chills/no diarrhea/no dysuria/no fever/no hematuria/no nausea

## 2020-12-28 NOTE — ED PROVIDER NOTE - CLINICAL SUMMARY MEDICAL DECISION MAKING FREE TEXT BOX
pt presented with abdominal bloating and vaginal spotting, serum beta is positive for pregnancy, sono shows no pregnancy which pt was told it is because she is too early, pt informed that she will need follow up with her ob for repeat blood tests and sono, pt also started on antibiotics for uti

## 2020-12-28 NOTE — ED PROVIDER NOTE - OBJECTIVE STATEMENT
40 year old female with h/o DM presents today c/o abdominal pain x 2 days, pt states that her symptoms started with abdominal bloating and vomiting x1 two days ago that 40 year old female with h/o DM presents today c/o abdominal pain x 2 days, pt states that her symptoms started with abdominal bloating and vomiting x1 two days ago that has since resolved, now pt reports left lower sided abdominal tenderness with vaginal spotting noted yesterday, no bleeding today (-) flu like illness, pt states that it is possible that she is pregnant

## 2020-12-28 NOTE — ED ADULT NURSE NOTE - OBJECTIVE STATEMENT
Pt c/o llq abdominal pain onset 2 days ago.  Pt reports one episode of vomiting 2 days ago, has not vomited since.  Denies diarrhea.  Pt denies fevers, is afebrile.  Pt asks "is it possible to be pregnant and still have a period?".  Pt states she is concerned that she is pregnant, lmp 12/10.

## 2020-12-29 LAB
CULTURE RESULTS: SIGNIFICANT CHANGE UP
SPECIMEN SOURCE: SIGNIFICANT CHANGE UP

## 2021-05-17 ENCOUNTER — APPOINTMENT (OUTPATIENT)
Dept: ENDOCRINOLOGY | Facility: CLINIC | Age: 41
End: 2021-05-17
Payer: COMMERCIAL

## 2021-05-17 ENCOUNTER — LABORATORY RESULT (OUTPATIENT)
Age: 41
End: 2021-05-17

## 2021-05-17 VITALS
RESPIRATION RATE: 17 BRPM | DIASTOLIC BLOOD PRESSURE: 70 MMHG | TEMPERATURE: 98.2 F | SYSTOLIC BLOOD PRESSURE: 112 MMHG | HEIGHT: 71 IN | WEIGHT: 223 LBS | HEART RATE: 83 BPM | BODY MASS INDEX: 31.22 KG/M2 | OXYGEN SATURATION: 99 %

## 2021-05-17 DIAGNOSIS — E04.9 NONTOXIC GOITER, UNSPECIFIED: ICD-10-CM

## 2021-05-17 LAB
C PEPTIDE SERPL-MCNC: 2.4 NG/ML
CREAT SPEC-SCNC: 123 MG/DL
ESTIMATED AVERAGE GLUCOSE: 249 MG/DL
GLUCOSE BLDC GLUCOMTR-MCNC: 240
HBA1C MFR BLD HPLC: 10.3 %
MICROALBUMIN 24H UR DL<=1MG/L-MCNC: <1.2 MG/DL
MICROALBUMIN/CREAT 24H UR-RTO: NORMAL MG/G

## 2021-05-17 PROCEDURE — 99072 ADDL SUPL MATRL&STAF TM PHE: CPT

## 2021-05-17 PROCEDURE — 82962 GLUCOSE BLOOD TEST: CPT

## 2021-05-17 PROCEDURE — 36415 COLL VENOUS BLD VENIPUNCTURE: CPT

## 2021-05-17 PROCEDURE — 99214 OFFICE O/P EST MOD 30 MIN: CPT | Mod: 25

## 2021-05-17 RX ORDER — INSULIN GLARGINE 100 [IU]/ML
100 INJECTION, SOLUTION SUBCUTANEOUS
Refills: 0 | Status: DISCONTINUED | COMMUNITY
End: 2021-05-17

## 2021-05-17 RX ORDER — INSULIN DEGLUDEC INJECTION 100 U/ML
100 INJECTION, SOLUTION SUBCUTANEOUS
Qty: 1 | Refills: 3 | Status: DISCONTINUED | COMMUNITY
Start: 2020-07-29 | End: 2021-05-17

## 2021-05-17 NOTE — ASSESSMENT
[FreeTextEntry1] : - compliance is reiterated\par - fasting blood work today\par - resume Dexcomg G6 and see Carmen in 2-3 weeks\par - since no plans for another pregnancy, resume Ozempic 0.25 mg qw, then incr to 0.5 mg qw\par - increase Levemir 25 units HS, Novolog 20-10- 20\par - hypo's precautions\par - will eventually add SGLT2. potentially can resume actos if needed\par - we reviewed insulin pumps and she'll discuss with Carmen\par - monitor lipids, blood pressure\par - ophthalmology evaluation\par - thyroid US\par RTC 3 mos\par

## 2021-05-17 NOTE — HISTORY OF PRESENT ILLNESS
[FreeTextEntry1] : F/u for diabetes management\par \par *** May 17, 2021 ***\par \par Missed f/u in-person visits\par feels ok. s/p spinal fusion sx\par off Ozempic b/o pregnancy plans. had an ectopic pregnancy in January. now, decided against another pregnancy and wants to get back on ozempic\par \par taking Levemir 20 units HS, Novolog 15-0-15\par no recent labs\par reports fbs- 120's- 150's, p/B and p/D- mid 200's\par was using Dexcom G6, but stopped some time ago\par recalls being briefly on farxiga, but felt "gasy" on it\par \par *** Phone visit  Dec 07, 2020 ***\par \par started fertility evaluation. stopped actos and Tresiba\par taking Novolog 20 un tid ac, Ozempic 0.25 mg qw\par reports fbs - 110's, ppg- in mid 200's\par \par no recent labs\par \par HISTORY OF PRESENT ILLNESS. \par \par Ms. TAPIA was diagnosed with Diabetes Mellitus Type 2 in 2014 \par Reports history HTN, dyslipidemia, sarcoidosis. She denies CAD,  known complications of retinopathy, nephropathy, or neuropathy. \par She reports a GI intolerance on metformin, and is presently on Basaglar 20 un HS, Novolog 16-0-16. She was briefly on Victoza  (using samples) with some help, but is not taking right now. She was previously under care of Dr. Membreno.\par Blood sugars are checked 2-3 times a day. She reports FBS in low 200's, ppg- 240's- 260's\par Hypoglycemia frequency: none\par Diet: not following ADA\par Exercise: none\par \par Last dilated eye - 2019\par Last podiatry visit  - 2019\par Last cardiology evaluation - none\par Last stress test - none\par Last 2-D Echo - does not recall\par Last nephrology evaluation - none\par Last neurology evaluation- none\par \par

## 2021-05-18 LAB
25(OH)D3 SERPL-MCNC: 16.1 NG/ML
ALBUMIN SERPL ELPH-MCNC: 4.3 G/DL
ALP BLD-CCNC: 94 U/L
ALT SERPL-CCNC: 19 U/L
ANION GAP SERPL CALC-SCNC: 11 MMOL/L
AST SERPL-CCNC: 14 U/L
BILIRUB SERPL-MCNC: 0.2 MG/DL
BUN SERPL-MCNC: 11 MG/DL
CALCIUM SERPL-MCNC: 9.6 MG/DL
CHLORIDE SERPL-SCNC: 99 MMOL/L
CHOLEST SERPL-MCNC: 250 MG/DL
CO2 SERPL-SCNC: 24 MMOL/L
CREAT SERPL-MCNC: 0.67 MG/DL
FOLATE SERPL-MCNC: 7 NG/ML
FRUCTOSAMINE SERPL-MCNC: 356 UMOL/L
GLUCOSE SERPL-MCNC: 212 MG/DL
HDLC SERPL-MCNC: 75 MG/DL
LDLC SERPL CALC-MCNC: 163 MG/DL
NONHDLC SERPL-MCNC: 175 MG/DL
POTASSIUM SERPL-SCNC: 4.5 MMOL/L
PROT SERPL-MCNC: 7.7 G/DL
SODIUM SERPL-SCNC: 134 MMOL/L
T4 FREE SERPL-MCNC: 1.4 NG/DL
TRIGL SERPL-MCNC: 59 MG/DL
TSH SERPL-ACNC: 1.32 UIU/ML
VIT B12 SERPL-MCNC: 403 PG/ML

## 2021-05-18 RX ORDER — SIMVASTATIN 10 MG/1
10 TABLET, FILM COATED ORAL
Refills: 0 | Status: DISCONTINUED | COMMUNITY
End: 2021-05-18

## 2021-05-19 LAB — PANC ISLET CELL AB SER QL: NORMAL

## 2021-05-21 LAB — GAD65 AB SER-MCNC: 0 NMOL/L

## 2021-05-24 LAB — ZINC TRANSPORTER 8 AB: <15 U/ML

## 2021-06-16 ENCOUNTER — APPOINTMENT (OUTPATIENT)
Dept: ENDOCRINOLOGY | Facility: CLINIC | Age: 41
End: 2021-06-16
Payer: COMMERCIAL

## 2021-06-16 VITALS — WEIGHT: 223 LBS | HEIGHT: 71 IN | BODY MASS INDEX: 31.22 KG/M2

## 2021-06-16 PROCEDURE — G0108 DIAB MANAGE TRN  PER INDIV: CPT

## 2021-06-23 NOTE — ED ADULT NURSE NOTE - NS ED NURSE LEVEL OF CONSCIOUSNESS MENTAL STATUS
Cooperative/Alert/Awake Melolabial Transposition Flap Text: The defect edges were debeveled with a #15 scalpel blade.  Given the location of the defect and the proximity to free margins a melolabial flap was deemed most appropriate.  Using a sterile surgical marker, an appropriate melolabial transposition flap was drawn incorporating the defect.    The area thus outlined was incised deep to adipose tissue with a #15 scalpel blade.  The skin margins were undermined to an appropriate distance in all directions utilizing iris scissors.

## 2021-09-20 ENCOUNTER — EMERGENCY (EMERGENCY)
Facility: HOSPITAL | Age: 41
LOS: 0 days | Discharge: HOME | End: 2021-09-20
Attending: EMERGENCY MEDICINE
Payer: COMMERCIAL

## 2021-09-20 VITALS
TEMPERATURE: 98 F | SYSTOLIC BLOOD PRESSURE: 112 MMHG | DIASTOLIC BLOOD PRESSURE: 73 MMHG | HEART RATE: 89 BPM | RESPIRATION RATE: 20 BRPM | OXYGEN SATURATION: 99 %

## 2021-09-20 VITALS
DIASTOLIC BLOOD PRESSURE: 92 MMHG | HEART RATE: 18 BPM | SYSTOLIC BLOOD PRESSURE: 178 MMHG | HEIGHT: 71 IN | WEIGHT: 210.1 LBS | RESPIRATION RATE: 19 BRPM | OXYGEN SATURATION: 100 % | TEMPERATURE: 98 F

## 2021-09-20 DIAGNOSIS — D86.9 SARCOIDOSIS, UNSPECIFIED: ICD-10-CM

## 2021-09-20 DIAGNOSIS — R07.9 CHEST PAIN, UNSPECIFIED: ICD-10-CM

## 2021-09-20 DIAGNOSIS — E78.5 HYPERLIPIDEMIA, UNSPECIFIED: ICD-10-CM

## 2021-09-20 DIAGNOSIS — R07.89 OTHER CHEST PAIN: ICD-10-CM

## 2021-09-20 DIAGNOSIS — E11.9 TYPE 2 DIABETES MELLITUS WITHOUT COMPLICATIONS: ICD-10-CM

## 2021-09-20 LAB
ALBUMIN SERPL ELPH-MCNC: 3.3 G/DL — SIGNIFICANT CHANGE UP (ref 3.3–5)
ALP SERPL-CCNC: 88 U/L — SIGNIFICANT CHANGE UP (ref 40–120)
ALT FLD-CCNC: 16 U/L — SIGNIFICANT CHANGE UP (ref 12–78)
ANION GAP SERPL CALC-SCNC: 8 MMOL/L — SIGNIFICANT CHANGE UP (ref 5–17)
AST SERPL-CCNC: 10 U/L — LOW (ref 15–37)
BASOPHILS # BLD AUTO: 0.04 K/UL — SIGNIFICANT CHANGE UP (ref 0–0.2)
BASOPHILS NFR BLD AUTO: 0.3 % — SIGNIFICANT CHANGE UP (ref 0–2)
BILIRUB SERPL-MCNC: 0.2 MG/DL — SIGNIFICANT CHANGE UP (ref 0.2–1.2)
BUN SERPL-MCNC: 6 MG/DL — LOW (ref 7–23)
CALCIUM SERPL-MCNC: 8.8 MG/DL — SIGNIFICANT CHANGE UP (ref 8.5–10.1)
CHLORIDE SERPL-SCNC: 103 MMOL/L — SIGNIFICANT CHANGE UP (ref 96–108)
CK MB CFR SERPL CALC: <1 NG/ML — SIGNIFICANT CHANGE UP (ref 0.5–3.6)
CO2 SERPL-SCNC: 26 MMOL/L — SIGNIFICANT CHANGE UP (ref 22–31)
CREAT SERPL-MCNC: 0.9 MG/DL — SIGNIFICANT CHANGE UP (ref 0.5–1.3)
EOSINOPHIL # BLD AUTO: 0.07 K/UL — SIGNIFICANT CHANGE UP (ref 0–0.5)
EOSINOPHIL NFR BLD AUTO: 0.5 % — SIGNIFICANT CHANGE UP (ref 0–6)
GLUCOSE SERPL-MCNC: 286 MG/DL — HIGH (ref 70–99)
HCG SERPL-ACNC: <1 MIU/ML — SIGNIFICANT CHANGE UP
HCT VFR BLD CALC: 34.1 % — LOW (ref 34.5–45)
HGB BLD-MCNC: 10.5 G/DL — LOW (ref 11.5–15.5)
IMM GRANULOCYTES NFR BLD AUTO: 0.4 % — SIGNIFICANT CHANGE UP (ref 0–1.5)
LYMPHOCYTES # BLD AUTO: 3.93 K/UL — HIGH (ref 1–3.3)
LYMPHOCYTES # BLD AUTO: 30.4 % — SIGNIFICANT CHANGE UP (ref 13–44)
MAGNESIUM SERPL-MCNC: 1.7 MG/DL — SIGNIFICANT CHANGE UP (ref 1.6–2.6)
MCHC RBC-ENTMCNC: 24.6 PG — LOW (ref 27–34)
MCHC RBC-ENTMCNC: 30.8 GM/DL — LOW (ref 32–36)
MCV RBC AUTO: 80 FL — SIGNIFICANT CHANGE UP (ref 80–100)
MONOCYTES # BLD AUTO: 0.88 K/UL — SIGNIFICANT CHANGE UP (ref 0–0.9)
MONOCYTES NFR BLD AUTO: 6.8 % — SIGNIFICANT CHANGE UP (ref 2–14)
NEUTROPHILS # BLD AUTO: 7.97 K/UL — HIGH (ref 1.8–7.4)
NEUTROPHILS NFR BLD AUTO: 61.6 % — SIGNIFICANT CHANGE UP (ref 43–77)
NRBC # BLD: 0 /100 WBCS — SIGNIFICANT CHANGE UP (ref 0–0)
PLATELET # BLD AUTO: 516 K/UL — HIGH (ref 150–400)
POTASSIUM SERPL-MCNC: 3.7 MMOL/L — SIGNIFICANT CHANGE UP (ref 3.5–5.3)
POTASSIUM SERPL-SCNC: 3.7 MMOL/L — SIGNIFICANT CHANGE UP (ref 3.5–5.3)
PROT SERPL-MCNC: 8 GM/DL — SIGNIFICANT CHANGE UP (ref 6–8.3)
RBC # BLD: 4.26 M/UL — SIGNIFICANT CHANGE UP (ref 3.8–5.2)
RBC # FLD: 15.3 % — HIGH (ref 10.3–14.5)
SODIUM SERPL-SCNC: 137 MMOL/L — SIGNIFICANT CHANGE UP (ref 135–145)
TROPONIN I SERPL-MCNC: <.015 NG/ML — SIGNIFICANT CHANGE UP (ref 0.01–0.04)
WBC # BLD: 12.94 K/UL — HIGH (ref 3.8–10.5)
WBC # FLD AUTO: 12.94 K/UL — HIGH (ref 3.8–10.5)

## 2021-09-20 PROCEDURE — 99285 EMERGENCY DEPT VISIT HI MDM: CPT

## 2021-09-20 PROCEDURE — 93010 ELECTROCARDIOGRAM REPORT: CPT

## 2021-09-20 PROCEDURE — 71275 CT ANGIOGRAPHY CHEST: CPT | Mod: 26,MA

## 2021-09-20 PROCEDURE — 71045 X-RAY EXAM CHEST 1 VIEW: CPT | Mod: 26

## 2021-09-20 PROCEDURE — 74174 CTA ABD&PLVS W/CONTRAST: CPT | Mod: 26,MA

## 2021-09-20 RX ORDER — MORPHINE SULFATE 50 MG/1
4 CAPSULE, EXTENDED RELEASE ORAL ONCE
Refills: 0 | Status: DISCONTINUED | OUTPATIENT
Start: 2021-09-20 | End: 2021-09-20

## 2021-09-20 RX ORDER — ACETAMINOPHEN 500 MG
975 TABLET ORAL ONCE
Refills: 0 | Status: COMPLETED | OUTPATIENT
Start: 2021-09-20 | End: 2021-09-20

## 2021-09-20 RX ORDER — ACETAMINOPHEN 500 MG
2 TABLET ORAL
Qty: 40 | Refills: 0
Start: 2021-09-20 | End: 2021-09-24

## 2021-09-20 RX ADMIN — Medication 975 MILLIGRAM(S): at 21:37

## 2021-09-20 RX ADMIN — Medication 975 MILLIGRAM(S): at 22:10

## 2021-09-20 RX ADMIN — MORPHINE SULFATE 4 MILLIGRAM(S): 50 CAPSULE, EXTENDED RELEASE ORAL at 21:02

## 2021-09-20 RX ADMIN — MORPHINE SULFATE 4 MILLIGRAM(S): 50 CAPSULE, EXTENDED RELEASE ORAL at 17:58

## 2021-09-20 NOTE — ED PROVIDER NOTE - CLINICAL SUMMARY MEDICAL DECISION MAKING FREE TEXT BOX
R/o dissection vs PE, plan for CT angio, labs and morphine. As interpreted by ED physician, ECG is NSR with normal intervals/axis, no changes in QRS, no ST/T changes.

## 2021-09-20 NOTE — ED PROVIDER NOTE - OBJECTIVE STATEMENT
40 yo F w/PMH of DM (on ozempic), sarcoidosis (now relieved) presents to the ED BIBA for intermittent pleuritic CP since 9am this morning. Pt reports a sharp pain that comes and goes and rates her pain a 10/10. States she has never had this pain before. Denies fever/chills, cough, palpitations, SOB, dizziness, abdominal pain, N/V/D or headache. PSH spinal fusion 2020. NKDA. Pt is on atorvastatin. Pt is fully vaccinated against COVID.

## 2021-09-20 NOTE — ED PROVIDER NOTE - PATIENT PORTAL LINK FT
You can access the FollowMyHealth Patient Portal offered by [facility] by registering at the following website: [email or web address]. By joining [employer]’s FollowMyHealth portal, you will also be able to view your health information using other applications (apps) compatible with our system.

## 2021-09-20 NOTE — ED ADULT TRIAGE NOTE - CHIEF COMPLAINT QUOTE
pt biba from home c/o chest pain started this morning getting progressively worse denies ny SOB dizziness or palpitation pt fully vaccinated 3 months ago

## 2021-09-20 NOTE — ED PROVIDER NOTE - PHYSICAL EXAMINATION
Gen: Alert, pt looks uncomfortable   Head: NC, AT   Eyes: PERRL, EOMI, normal lids/conjunctiva  ENT: normal hearing, patent oropharynx without erythema/exudate, uvula midline  Neck: supple, no tenderness, Trachea midline  Pulm: Bilateral BS, normal resp effort, no wheeze/stridor/retractions  CV: RRR, no M/R/G, 2+ radial and dp pulses bl, no edema  Abd: soft, NT/ND, +BS, no hepatosplenomegaly  Mskel: extremities x4 with normal ROM and no joint effusions. no ctl spine ttp. +TTP anterior sternum   Skin: no rash, no bruising   Neuro: AAOx3, no sensory/motor deficits, CN 2-12 intact

## 2021-09-20 NOTE — ED ADULT NURSE NOTE - NEURO BEHAVIOR
OB?  No  Pharmacy Verified? Yes   Reason for Call: pt states she picked up medicine and pt states she would like to speak to TAYLER Hampton form of Contact:      Cell Phone:   Telephone Information:   Mobile [phone]     Okay to leave a detailed message regarding call? Yes      Have you or a member of your household experienced any of the following symptoms?  Fever greater than 100? No  New or worsening cough, runny nose, shortness of breath or sore throat? no  Headache, body aches or fatigue? no  Nausea, vomiting or diarrhea? no  New onset of loss of taste or smell? no  Have you or a member of your household had contact with a laboratory confirmed COVID-19 person within the past 14 days? no  Have you or a member of your household traveled outside of [place] within the last 14 days, if yes where? no         calm

## 2021-09-20 NOTE — ED PROVIDER NOTE - PROGRESS NOTE DETAILS
Results reported to patient--grossly benign, labs unremarkable, CT shows no acute pathology   Pt. reports feeling better after meds  pt. agrees to f/u with primary care outpt., referred to pulm for urgent f/u   pt. understands to return to ED if symptoms worsen; will d/c with Tylenol prn, as per plan with primary attending of record

## 2021-09-20 NOTE — ED PROVIDER NOTE - CARE PROVIDER_API CALL
Andrew Khalil)  Critical Care Medicine; Internal Medicine; Pulmonary Disease  [address] [place] [address]  Phone: [phone]  Fax: [phone]  Follow Up Time: 4-6 Days

## 2021-09-20 NOTE — ED ADULT NURSE NOTE - OBJECTIVE STATEMENT
pt biba from home a&o x4, ambulatory, pt c/o chest pain started this morning getting progressively worse since this morning. pt  denies fever / chills, difficulty breathing, SOB dizziness or palpitation pt fully vaccinated 3 months ago. pt  states pain worsening on deep inspiration. takes tramadol for neck pain hx spinal fusion. pt states pain is 6/10. pain intermittent,

## 2021-09-24 ENCOUNTER — EMERGENCY (EMERGENCY)
Facility: HOSPITAL | Age: 41
LOS: 0 days | Discharge: HOME | End: 2021-09-24
Attending: EMERGENCY MEDICINE
Payer: COMMERCIAL

## 2021-09-24 VITALS
RESPIRATION RATE: 18 BRPM | SYSTOLIC BLOOD PRESSURE: 127 MMHG | WEIGHT: 210.1 LBS | HEIGHT: 71 IN | DIASTOLIC BLOOD PRESSURE: 84 MMHG | HEART RATE: 86 BPM

## 2021-09-24 DIAGNOSIS — E78.5 HYPERLIPIDEMIA, UNSPECIFIED: ICD-10-CM

## 2021-09-24 DIAGNOSIS — E11.9 TYPE 2 DIABETES MELLITUS WITHOUT COMPLICATIONS: ICD-10-CM
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
## 2021-09-24 DIAGNOSIS — S40.021A CONTUSION OF RIGHT UPPER ARM, INITIAL ENCOUNTER: ICD-10-CM

## 2021-09-24 DIAGNOSIS — D86.9 SARCOIDOSIS, UNSPECIFIED: ICD-10-CM
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
## 2021-09-24 PROCEDURE — 99284 EMERGENCY DEPT VISIT MOD MDM: CPT

## 2021-09-24 RX ORDER — METHOCARBAMOL 500 MG/1
2 TABLET, FILM COATED ORAL
Qty: 30 | Refills: 0
Start: 2021-09-24 | End: 2021-09-28

## 2021-09-24 RX ORDER — ACETAMINOPHEN 500 MG
2 TABLET ORAL
Qty: 40 | Refills: 0
Start: 2021-09-24 | End: 2021-09-28

## 2021-09-24 RX ORDER — KETOROLAC TROMETHAMINE 30 MG/ML
60 SYRINGE (ML) INJECTION ONCE
Refills: 0 | Status: DISCONTINUED | OUTPATIENT
Start: 2021-09-24 | End: 2021-09-24

## 2021-09-24 RX ORDER — TRAMADOL HYDROCHLORIDE 50 MG/1
1 TABLET ORAL
Qty: 12 | Refills: 0
Start: 2021-09-24 | End: 2021-09-26

## 2021-09-24 RX ADMIN — Medication 60 MILLIGRAM(S): at 11:58

## 2021-09-24 NOTE — ED ADULT TRIAGE NOTE - SOURCE OF INFORMATION
Discharge Instructions for Laparoscopic Hysterectomy  You had a procedure called laparoscopic hysterectomy. A surgeon removed your uterus using instruments inserted through small incisions in your abdomen. These incisions may be tender or sore. You may also have pain in your upper back or shoulders. This is from the gas used to enlarge your abdomen to allow your doctor to see inside your pelvis and perform the procedure. This pain usually goes away in a day or two. It usually takes from 1 to 4 weeks to recover from laparoscopic hysterectomy. Remember, though, that recovery time varies from woman to woman. Here's what you can do to speed your recovery following surgery.  Home care   · Continue the coughing and deep breathing exercises that you learned in the hospital.  · Take your medications exactly as directed by your doctor.  · Avoid constipation.  ¨ Eat fruits, vegetables, and whole grains.  ¨ Drink 6 to 8 glasses of water a day, unless told to do otherwise.  ¨ Use a laxative or a mild stool softener if your doctor says it's OK.  · Shower as usual. Wash your incisions with mild soap and water. Pat dry.  · Don't use oils, powders, or lotions on your incisions.  · Don't put anything in your vagina until your doctor says it's safe to do so. Don't use tampons or douches. Don't have sex.  · If you had both ovaries removed, report hot flashes, mood swings, and irritability to your doctor. There may be medications that can help you.  Activity  · Ask your doctor when you can start driving again. It's usually okay to drive as soon as you are free of pain and able to move comfortably from side to side. Don't drive while you are still taking opioid pain medications.  · Ask others to help with chores and errands while you recover.  · Dont lift anything heavier than 10 pounds for 6 weeks.  · Dont vacuum or do other strenuous activities until the doctor says it's OK.  · Walk as often as you feel able.  · Don't drive for a  few days after the surgery. You may drive as soon as you are able to move comfortably from side to side and when you are no longer taking narcotics.  · Climb stairs slowly and pause after every few steps.  Follow-up care  Make a follow-up appointment as directed by our staff.     When to call your doctor  Call your doctor right away if you have any of the following:  · Fever above 100.4°F (38°C) or chills  · Bright red vaginal bleeding or vaginal bleeding that soaks more than one sanitary pad per hour  · A foul smelling discharge from the vagina  · Trouble urinating or burning when you urinate  · Severe pain or bloating in your abdomen  · Redness, swelling, or drainage at your incision sites  · Shortness of breath or chest pain  · Nausea and vomiting   Date Last Reviewed: 5/19/2015  © 8510-4508 [employer]. [address] [place] [id number]. All rights reserved. This information is not intended as a substitute for professional medical care. Always follow your healthcare professional's instructions.         Patient

## 2021-09-24 NOTE — ED PROVIDER NOTE - MUSCULOSKELETAL MINIMAL EXAM
bruising noted on R forearm extending beyond elbow up midway to humerus ROM of arm full, no limitaiton. bruising noted on R forearm extending beyond elbow up midway to humerus ROM of arm full, no limitaiton. humeral area with minor abrasion noted as well
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
## 2021-09-24 NOTE — ED PROVIDER NOTE - PATIENT PORTAL LINK FT
You can access the FollowMyHealth Patient Portal offered by [facility] by registering at the following website: [email or web address]. By joining [employer]’s FollowMyHealth portal, you will also be able to view your health information using other applications (apps) compatible with our system.
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
## 2021-09-24 NOTE — ED ADULT NURSE NOTE - SUICIDE SCREENING QUESTION 2
Items sent to security include:     1 inhaler (beclomethasone)  1 set of keys  1 lighter  1 cell phone  Various cards  
No
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
## 2021-09-24 NOTE — ED PROVIDER NOTE - ENMT, MLM
Airway patent, Nasal mucosa clear. Mouth with normal mucosa. Throat has no vesicles, no oropharyngeal exudates and uvula is midline. No bruising of neck noted

## 2021-10-21 ENCOUNTER — APPOINTMENT (OUTPATIENT)
Dept: ENDOCRINOLOGY | Facility: CLINIC | Age: 41
End: 2021-10-21

## 2022-01-20 ENCOUNTER — EMERGENCY (EMERGENCY)
Facility: HOSPITAL | Age: 42
LOS: 0 days | Discharge: HOME | End: 2022-01-20
Attending: EMERGENCY MEDICINE
Payer: COMMERCIAL

## 2022-01-20 VITALS
SYSTOLIC BLOOD PRESSURE: 111 MMHG | HEIGHT: 71 IN | RESPIRATION RATE: 19 BRPM | OXYGEN SATURATION: 100 % | DIASTOLIC BLOOD PRESSURE: 75 MMHG | HEART RATE: 89 BPM | WEIGHT: 210.1 LBS | TEMPERATURE: 98 F

## 2022-01-20 DIAGNOSIS — E78.5 HYPERLIPIDEMIA, UNSPECIFIED: ICD-10-CM
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
## 2022-01-20 DIAGNOSIS — S39.012A STRAIN OF MUSCLE, FASCIA AND TENDON OF LOWER BACK, INITIAL ENCOUNTER: ICD-10-CM

## 2022-01-20 DIAGNOSIS — M54.2 CERVICALGIA: ICD-10-CM

## 2022-01-20 DIAGNOSIS — S16.1XXA STRAIN OF MUSCLE, FASCIA AND TENDON AT NECK LEVEL, INITIAL ENCOUNTER: ICD-10-CM

## 2022-01-20 DIAGNOSIS — E11.9 TYPE 2 DIABETES MELLITUS WITHOUT COMPLICATIONS: ICD-10-CM
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
## 2022-01-20 DIAGNOSIS — M54.50 LOW BACK PAIN, UNSPECIFIED: ICD-10-CM

## 2022-01-20 PROCEDURE — 99284 EMERGENCY DEPT VISIT MOD MDM: CPT

## 2022-01-20 RX ORDER — CYCLOBENZAPRINE HYDROCHLORIDE 10 MG/1
1 TABLET, FILM COATED ORAL
Qty: 15 | Refills: 0
Start: 2022-01-20 | End: 2022-01-24

## 2022-01-20 RX ORDER — IBUPROFEN 200 MG
1 TABLET ORAL
Qty: 15 | Refills: 0
Start: 2022-01-20 | End: 2022-01-24

## 2022-01-20 RX ORDER — IBUPROFEN 200 MG
600 TABLET ORAL ONCE
Refills: 0 | Status: COMPLETED | OUTPATIENT
Start: 2022-01-20 | End: 2022-01-20

## 2022-01-20 RX ORDER — CYCLOBENZAPRINE HYDROCHLORIDE 10 MG/1
10 TABLET, FILM COATED ORAL ONCE
Refills: 0 | Status: COMPLETED | OUTPATIENT
Start: 2022-01-20 | End: 2022-01-20

## 2022-01-20 RX ADMIN — CYCLOBENZAPRINE HYDROCHLORIDE 10 MILLIGRAM(S): 10 TABLET, FILM COATED ORAL at 11:57

## 2022-01-20 RX ADMIN — Medication 600 MILLIGRAM(S): at 11:57
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
## 2022-01-20 NOTE — ED PROVIDER NOTE - CARE PLAN
1 Principal Discharge DX:	Neck muscle strain  Secondary Diagnosis:	Strain of muscle, fascia and tendon of lower back, subsequent encounter

## 2022-01-20 NOTE — ED PROVIDER NOTE - CONSTITUTIONAL, MLM
Well appearing, awake, alert, oriented to person, place, time/situation and in no apparent distress. Speaking in clear full sentences no nasal flaring no shoulders retractions no diaphoresis, appears very comfortable sitting up in the stretcher in a bright light room normal...
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
## 2022-01-20 NOTE — ED PROVIDER NOTE - CLINICAL SUMMARY MEDICAL DECISION MAKING FREE TEXT BOX
hx, exam, Pt has no mid line cervical and lumbar tenderness no focal neuro deficits on exam, Pt does not need imaging studies Pt is advised to follow up with her spinal doctor and return if symptoms persist or worsen.
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
## 2022-01-20 NOTE — ED ADULT NURSE NOTE - NSIMPLEMENTINTERV_GEN_ALL_ED
Implemented All Universal Safety Interventions:  [place] to call system. Call bell, personal items and telephone within reach. Instruct patient to call for assistance. Room bathroom lighting operational. Non-slip footwear when patient is off stretcher. Physically safe environment: no spills, clutter or unnecessary equipment. Stretcher in lowest position, wheels locked, appropriate side rails in place.

## 2022-01-20 NOTE — ED PROVIDER NOTE - PATIENT PORTAL LINK FT
You can access the FollowMyHealth Patient Portal offered by [facility] by registering at the following website: [email or web address]. By joining [employer]’s FollowMyHealth portal, you will also be able to view your health information using other applications (apps) compatible with our system.

## 2022-04-01 ENCOUNTER — EMERGENCY (EMERGENCY)
Facility: HOSPITAL | Age: 42
LOS: 0 days | Discharge: HOME | End: 2022-04-01
Attending: STUDENT IN AN ORGANIZED HEALTH CARE EDUCATION/TRAINING PROGRAM
Payer: COMMERCIAL

## 2022-04-01 VITALS
RESPIRATION RATE: 16 BRPM | HEIGHT: 71 IN | DIASTOLIC BLOOD PRESSURE: 72 MMHG | OXYGEN SATURATION: 99 % | SYSTOLIC BLOOD PRESSURE: 117 MMHG | TEMPERATURE: 98 F | WEIGHT: 199.96 LBS | HEART RATE: 96 BPM

## 2022-04-01 VITALS
RESPIRATION RATE: 17 BRPM | HEART RATE: 88 BPM | DIASTOLIC BLOOD PRESSURE: 75 MMHG | OXYGEN SATURATION: 99 % | SYSTOLIC BLOOD PRESSURE: 126 MMHG | TEMPERATURE: 98 F

## 2022-04-01 DIAGNOSIS — M54.16 RADICULOPATHY, LUMBAR REGION: ICD-10-CM

## 2022-04-01 DIAGNOSIS — E11.9 TYPE 2 DIABETES MELLITUS WITHOUT COMPLICATIONS: ICD-10-CM

## 2022-04-01 DIAGNOSIS — E78.5 HYPERLIPIDEMIA, UNSPECIFIED: ICD-10-CM

## 2022-04-01 DIAGNOSIS — Z79.4 LONG TERM (CURRENT) USE OF INSULIN: ICD-10-CM

## 2022-04-01 DIAGNOSIS — M54.9 DORSALGIA, UNSPECIFIED: ICD-10-CM

## 2022-04-01 LAB
ALBUMIN SERPL ELPH-MCNC: 3.4 G/DL — SIGNIFICANT CHANGE UP (ref 3.3–5)
ALP SERPL-CCNC: 95 U/L — SIGNIFICANT CHANGE UP (ref 40–120)
ALT FLD-CCNC: 21 U/L — SIGNIFICANT CHANGE UP (ref 12–78)
ANION GAP SERPL CALC-SCNC: 6 MMOL/L — SIGNIFICANT CHANGE UP (ref 5–17)
AST SERPL-CCNC: 14 U/L — LOW (ref 15–37)
BASOPHILS # BLD AUTO: 0.05 K/UL — SIGNIFICANT CHANGE UP (ref 0–0.2)
BASOPHILS NFR BLD AUTO: 0.4 % — SIGNIFICANT CHANGE UP (ref 0–2)
BILIRUB SERPL-MCNC: 0.2 MG/DL — SIGNIFICANT CHANGE UP (ref 0.2–1.2)
BUN SERPL-MCNC: 14 MG/DL — SIGNIFICANT CHANGE UP (ref 7–23)
CALCIUM SERPL-MCNC: 9 MG/DL — SIGNIFICANT CHANGE UP (ref 8.5–10.1)
CHLORIDE SERPL-SCNC: 102 MMOL/L — SIGNIFICANT CHANGE UP (ref 96–108)
CO2 SERPL-SCNC: 28 MMOL/L — SIGNIFICANT CHANGE UP (ref 22–31)
CREAT SERPL-MCNC: 0.72 MG/DL — SIGNIFICANT CHANGE UP (ref 0.5–1.3)
EGFR: 106 ML/MIN/1.73M2 — SIGNIFICANT CHANGE UP
EOSINOPHIL # BLD AUTO: 0.16 K/UL — SIGNIFICANT CHANGE UP (ref 0–0.5)
EOSINOPHIL NFR BLD AUTO: 1.4 % — SIGNIFICANT CHANGE UP (ref 0–6)
GLUCOSE SERPL-MCNC: 146 MG/DL — HIGH (ref 70–99)
HCG SERPL-ACNC: <1 MIU/ML — SIGNIFICANT CHANGE UP
HCT VFR BLD CALC: 36.9 % — SIGNIFICANT CHANGE UP (ref 34.5–45)
HGB BLD-MCNC: 11.2 G/DL — LOW (ref 11.5–15.5)
IMM GRANULOCYTES NFR BLD AUTO: 0.4 % — SIGNIFICANT CHANGE UP (ref 0–1.5)
LYMPHOCYTES # BLD AUTO: 4.6 K/UL — HIGH (ref 1–3.3)
LYMPHOCYTES # BLD AUTO: 40.9 % — SIGNIFICANT CHANGE UP (ref 13–44)
MCHC RBC-ENTMCNC: 24.9 PG — LOW (ref 27–34)
MCHC RBC-ENTMCNC: 30.4 G/DL — LOW (ref 32–36)
MCV RBC AUTO: 82.2 FL — SIGNIFICANT CHANGE UP (ref 80–100)
MONOCYTES # BLD AUTO: 0.86 K/UL — SIGNIFICANT CHANGE UP (ref 0–0.9)
MONOCYTES NFR BLD AUTO: 7.6 % — SIGNIFICANT CHANGE UP (ref 2–14)
NEUTROPHILS # BLD AUTO: 5.55 K/UL — SIGNIFICANT CHANGE UP (ref 1.8–7.4)
NEUTROPHILS NFR BLD AUTO: 49.3 % — SIGNIFICANT CHANGE UP (ref 43–77)
NRBC # BLD: 0 /100 WBCS — SIGNIFICANT CHANGE UP (ref 0–0)
PLATELET # BLD AUTO: 515 K/UL — HIGH (ref 150–400)
POTASSIUM SERPL-MCNC: 3.9 MMOL/L — SIGNIFICANT CHANGE UP (ref 3.5–5.3)
POTASSIUM SERPL-SCNC: 3.9 MMOL/L — SIGNIFICANT CHANGE UP (ref 3.5–5.3)
PROT SERPL-MCNC: 7.8 GM/DL — SIGNIFICANT CHANGE UP (ref 6–8.3)
RBC # BLD: 4.49 M/UL — SIGNIFICANT CHANGE UP (ref 3.8–5.2)
RBC # FLD: 15.3 % — HIGH (ref 10.3–14.5)
SODIUM SERPL-SCNC: 136 MMOL/L — SIGNIFICANT CHANGE UP (ref 135–145)
WBC # BLD: 11.26 K/UL — HIGH (ref 3.8–10.5)
WBC # FLD AUTO: 11.26 K/UL — HIGH (ref 3.8–10.5)

## 2022-04-01 PROCEDURE — 72100 X-RAY EXAM L-S SPINE 2/3 VWS: CPT | Mod: 26

## 2022-04-01 PROCEDURE — 99283 EMERGENCY DEPT VISIT LOW MDM: CPT

## 2022-04-01 PROCEDURE — 76856 US EXAM PELVIC COMPLETE: CPT | Mod: 26

## 2022-04-01 RX ORDER — SODIUM CHLORIDE 9 MG/ML
1000 INJECTION INTRAMUSCULAR; INTRAVENOUS; SUBCUTANEOUS ONCE
Refills: 0 | Status: DISCONTINUED | OUTPATIENT
Start: 2022-04-01 | End: 2022-04-01

## 2022-04-01 RX ORDER — KETOROLAC TROMETHAMINE 30 MG/ML
30 SYRINGE (ML) INJECTION ONCE
Refills: 0 | Status: DISCONTINUED | OUTPATIENT
Start: 2022-04-01 | End: 2022-04-01

## 2022-04-01 RX ORDER — METHOCARBAMOL 500 MG/1
1000 TABLET, FILM COATED ORAL ONCE
Refills: 0 | Status: COMPLETED | OUTPATIENT
Start: 2022-04-01 | End: 2022-04-01

## 2022-04-01 RX ORDER — MORPHINE SULFATE 50 MG/1
2 CAPSULE, EXTENDED RELEASE ORAL ONCE
Refills: 0 | Status: DISCONTINUED | OUTPATIENT
Start: 2022-04-01 | End: 2022-04-01

## 2022-04-01 RX ADMIN — Medication 125 MILLIGRAM(S): at 15:45

## 2022-04-01 RX ADMIN — METHOCARBAMOL 1000 MILLIGRAM(S): 500 TABLET, FILM COATED ORAL at 15:45

## 2022-04-01 RX ADMIN — Medication 30 MILLIGRAM(S): at 15:45

## 2022-04-01 RX ADMIN — MORPHINE SULFATE 2 MILLIGRAM(S): 50 CAPSULE, EXTENDED RELEASE ORAL at 15:45

## 2022-04-01 NOTE — ED ADULT TRIAGE NOTE - CHIEF COMPLAINT QUOTE
pt a&O x4 biba from work difficulty ambulating c.o o lower back pack pain 10/10 hx of fibroids. pt was suppose to receive epidural 4/7  but pin unbearable. PMH dm, spinal fusions, DM pt a&O x4 biba from work difficulty ambulating c.o o lower back pack pain 10/10 hx of fibroids. pt was suppose to receive epidural 4/7  but pain unbearable. PMH dm, spinal fusions, DM

## 2022-04-01 NOTE — ED ADULT NURSE NOTE - CHIEF COMPLAINT QUOTE
pt a&O x4 biba from work difficulty ambulating c.o o lower back pack pain 10/10 hx of fibroids. pt was suppose to receive epidural 4/7  but pain unbearable. PMH dm, spinal fusions, DM

## 2022-04-01 NOTE — ED PROVIDER NOTE - PATIENT PORTAL LINK FT
You can access the FollowMyHealth Patient Portal offered by [facility] by registering at the following website: [email or web address]. By joining [employer]’s FollowMyHealth portal, you will also be able to view your health information using other applications (apps) compatible with our system.

## 2022-04-01 NOTE — ED PROVIDER NOTE - WET READ LAUNCH FT
Levothyroxine  Last Written Prescription Date: 8/13/20  Last Fill Quantity: 30 # of Refills: 0  Last Office Visit: 8/19/20       There are no Wet Read(s) to document.

## 2022-04-01 NOTE — ED PROVIDER NOTE - MUSCULOSKELETAL, MLM
Spine appears normal, range of motion is not limited, no muscle or joint tenderness +right straight leg test +right buttock tenderness w palpation
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
## 2022-04-01 NOTE — ED ADULT NURSE NOTE - NSIMPLEMENTINTERV_GEN_ALL_ED
Implemented All Fall Risk Interventions:  [place] to call system. Call bell, personal items and telephone within reach. Instruct patient to call for assistance. Room bathroom lighting operational. Non-slip footwear when patient is off stretcher. Physically safe environment: no spills, clutter or unnecessary equipment. Stretcher in lowest position, wheels locked, appropriate side rails in place. Provide visual cue, wrist band, yellow gown, etc. Monitor gait and stability. Monitor for mental status changes and reorient to person, place, and time. Review medications for side effects contributing to fall risk. Reinforce activity limits and safety measures with patient and family.

## 2022-04-14 ENCOUNTER — APPOINTMENT (OUTPATIENT)
Dept: ENDOCRINOLOGY | Facility: CLINIC | Age: 42
End: 2022-04-14

## 2022-05-09 ENCOUNTER — APPOINTMENT (OUTPATIENT)
Dept: ENDOCRINOLOGY | Facility: CLINIC | Age: 42
End: 2022-05-09
Payer: COMMERCIAL

## 2022-05-09 VITALS
DIASTOLIC BLOOD PRESSURE: 74 MMHG | HEIGHT: 71 IN | WEIGHT: 205 LBS | SYSTOLIC BLOOD PRESSURE: 120 MMHG | HEART RATE: 100 BPM | BODY MASS INDEX: 28.7 KG/M2 | TEMPERATURE: 98.1 F | OXYGEN SATURATION: 99 %

## 2022-05-09 LAB — GLUCOSE BLDC GLUCOMTR-MCNC: 307

## 2022-05-09 PROCEDURE — 99214 OFFICE O/P EST MOD 30 MIN: CPT | Mod: 25

## 2022-05-09 PROCEDURE — 36415 COLL VENOUS BLD VENIPUNCTURE: CPT

## 2022-05-09 RX ORDER — INSULIN DETEMIR 100 [IU]/ML
100 INJECTION, SOLUTION SUBCUTANEOUS
Qty: 3 | Refills: 11 | Status: DISCONTINUED | COMMUNITY
Start: 2020-12-07 | End: 2022-05-09

## 2022-05-09 NOTE — HISTORY OF PRESENT ILLNESS
[FreeTextEntry1] : F/u for diabetes management\par \par *** May 09, 2022 ***\par \par missed f/u appts\par taking Levemir 20 units HS, Novolog 30 to 38 un ac B and D only, Ozempic 0.5 mg qw\par not taking actos\par stopped using the sensor (had rash from the tape)\par Reports fbs 200-220, ppg 180's- 270's\par \par Thyr US (6/22/22)- Left isthmus cyst, 0.3 x 0.3 x 0.3 cm. There is a 0.5 x 0.4 x 0.6 cm cystic lesion just deep to the skin in the neck anterior to the right thyroid lobe. This is nonspecific but may represent a sebaceous cyst.\par \par \par *** May 17, 2021 ***\par \par Missed f/u in-person visits\par feels ok. s/p spinal fusion sx\par off Ozempic b/o pregnancy plans. had an ectopic pregnancy in January. now, decided against another pregnancy and wants to get back on ozempic\par \par taking Levemir 20 units HS, Novolog 15-0-15\par no recent labs\par reports fbs- 120's- 150's, p/B and p/D- mid 200's\par was using Dexcom G6, but stopped some time ago\par recalls being briefly on farxiga, but felt "gasy" on it\par \par *** Phone visit  Dec 07, 2020 ***\par \par started fertility evaluation. stopped actos and Tresiba\par taking Novolog 20 un tid ac, Ozempic 0.25 mg qw\par reports fbs - 110's, ppg- in mid 200's\par \par no recent labs\par \par HISTORY OF PRESENT ILLNESS. \par \par Ms. TAPIA was diagnosed with Diabetes Mellitus Type 2 in 2014 \par Reports history HTN, dyslipidemia, sarcoidosis. She denies CAD,  known complications of retinopathy, nephropathy, or neuropathy. \par She reports a GI intolerance on metformin, and is presently on Basaglar 20 un HS, Novolog 16-0-16. She was briefly on Victoza  (using samples) with some help, but is not taking right now. She was previously under care of Dr. Membreno.\par Blood sugars are checked 2-3 times a day. She reports FBS in low 200's, ppg- 240's- 260's\par Hypoglycemia frequency: none\par Diet: not following ADA\par Exercise: none\par \par Last dilated eye - 2019\par Last podiatry visit  - 2019\par Last cardiology evaluation - none\par Last stress test - none\par Last 2-D Echo - does not recall\par Last nephrology evaluation - none\par Last neurology evaluation- none\par \par

## 2022-05-09 NOTE — ASSESSMENT
[Diabetes Foot Care] : diabetes foot care [Long Term Vascular Complications] : long term vascular complications of diabetes [Carbohydrate Consistent Diet] : carbohydrate consistent diet [Importance of Diet and Exercise] : importance of diet and exercise to improve glycemic control, achieve weight loss and improve cardiovascular health [Exercise/Effect on Glucose] : exercise/effect on glucose [Hypoglycemia Management] : hypoglycemia management [Glucagon Use] : glucagon use [Ketone Testing] : ketone testing [Action and use of Insulin] : action and use of short and long-acting insulin [Self Monitoring of Blood Glucose] : self monitoring of blood glucose [Insulin Self-Administration] : insulin self-administration [Injection Technique, Storage, Sharps Disposal] : injection technique, storage, and sharps disposal [Sick-Day Management] : sick-day management [Retinopathy Screening] : Patient was referred to ophthalmology for retinopathy screening [Diabetic Medications] : Risks and benefits of diabetic medications were discussed [FreeTextEntry1] : 1. T2DM, uncontrolled\par - compliance to meds and f/u is reiterated\par - blood work today\par - Jose PRO and if no rash, will switch to a personal Jose\par - since no plans for another pregnancy, cont Ozempic and increase to 1 mg qw\par - d/c Levemir\par - Tresiba 30 un HS, Novolog 25-10-25\par - resume actos 30 mg qd \par - hypo's precautions\par - will eventually add SGLT2. \par - we reviewed insulin pumps and she'll discuss with Carmen\par - monitor lipids, blood pressure\par - ophthalmology evaluation\par \par 2. MNG\par - repeat  thyroid US this month\par RTC 3 mos\par

## 2022-07-08 ENCOUNTER — EMERGENCY (EMERGENCY)
Facility: HOSPITAL | Age: 42
LOS: 0 days | Discharge: HOME | End: 2022-07-08
Attending: STUDENT IN AN ORGANIZED HEALTH CARE EDUCATION/TRAINING PROGRAM

## 2022-07-08 VITALS
OXYGEN SATURATION: 99 % | WEIGHT: 207.01 LBS | HEIGHT: 71 IN | RESPIRATION RATE: 18 BRPM | SYSTOLIC BLOOD PRESSURE: 131 MMHG | DIASTOLIC BLOOD PRESSURE: 93 MMHG | HEART RATE: 95 BPM | TEMPERATURE: 98 F

## 2022-07-08 DIAGNOSIS — J02.9 ACUTE PHARYNGITIS, UNSPECIFIED: ICD-10-CM

## 2022-07-08 DIAGNOSIS — U07.1 COVID-19: ICD-10-CM

## 2022-07-08 DIAGNOSIS — E11.9 TYPE 2 DIABETES MELLITUS WITHOUT COMPLICATIONS: ICD-10-CM

## 2022-07-08 DIAGNOSIS — Z79.4 LONG TERM (CURRENT) USE OF INSULIN: ICD-10-CM

## 2022-07-08 LAB
FLUAV AG NPH QL: SIGNIFICANT CHANGE UP
FLUBV AG NPH QL: SIGNIFICANT CHANGE UP
SARS-COV-2 RNA SPEC QL NAA+PROBE: DETECTED

## 2022-07-08 PROCEDURE — 99284 EMERGENCY DEPT VISIT MOD MDM: CPT

## 2022-07-08 RX ORDER — SIMVASTATIN 20 MG/1
1 TABLET, FILM COATED ORAL
Qty: 0 | Refills: 0 | DISCHARGE

## 2022-07-08 RX ORDER — TRAMADOL HYDROCHLORIDE 50 MG/1
1 TABLET ORAL
Qty: 0 | Refills: 0 | DISCHARGE

## 2022-07-08 RX ORDER — SEMAGLUTIDE 0.68 MG/ML
0 INJECTION, SOLUTION SUBCUTANEOUS
Qty: 0 | Refills: 0 | DISCHARGE

## 2022-07-08 RX ORDER — INSULIN DEGLUDEC 100 U/ML
20 INJECTION, SOLUTION SUBCUTANEOUS
Qty: 0 | Refills: 0 | DISCHARGE

## 2022-07-08 RX ORDER — INSULIN ASPART 100 [IU]/ML
20 INJECTION, SOLUTION SUBCUTANEOUS
Qty: 0 | Refills: 0 | DISCHARGE

## 2022-07-08 NOTE — ED PROVIDER NOTE - CLINICAL SUMMARY MEDICAL DECISION MAKING FREE TEXT BOX
42F w/ PMH DM presents to ED for COVID testing, + sore throat x 1 day, ROS otherwise negative. AF, VSS. Well appearing, in NAD. Plan: Send Flu / COVID swab. D/c home. Instructed to f/u results of testing, recommend PCP f/u. Return signs / symptoms d/w pt. She understands / agrees w/ this plan.

## 2022-07-08 NOTE — ED ADULT NURSE NOTE - NS ED NOTE ABUSE SUSPICION NEGLECT YN
Diabetes Education Scheduling Outreach #1:    Call to patient to schedule. Left message with phone number to call to schedule.    Plan for 2nd outreach attempt within 2 business days.    [facility]  Diabetes and Nutrition Scheduling      
No

## 2022-07-08 NOTE — ED ADULT NURSE NOTE - CHIEF COMPLAINT
The patient is a 42y Female complaining of sore throat. Detail Level: Detailed Quality 337: Tuberculosis Prevention For Psoriasis And Psoriatic Arthritis Patients On A Biological Immune Response Modifier: Patient has a documented negative annual TB screening. Quality 410: Psoriasis Clinical Response To Oral Systemic Or Biologic Medications: Patient has been on biologic or system therapy for less than 6 months Detail Level: Zone

## 2022-07-08 NOTE — ED PROVIDER NOTE - OBJECTIVE STATEMENT
42F w/ PMH DM presents to ED for COVID testing. 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 Pt endorses + sore throat x 1day. ROS otherwise negative: Denies F/C, earache, h/a, nasal congestion, rhinorrhea, dizziness, CP, SOB, cough, abd pain, back pain, N/V/D/C, UTI sx, LE pain / swelling. Pt states she took home COVID test, w/ noted faint (+) line. Requesting PCR testing. Pt scheduled for spinal surgery in upcoming weeks.     PMH as above, meds as listed, NKDA, PSH spinal. 42F w/ PMH DM presents to ED for COVID testing. 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 Pt endorses + sore throat x 1day. ROS otherwise negative: Denies F/C, earache, h/a, nasal congestion, rhinorrhea, dizziness, CP, SOB, cough, abd pain, back pain, N/V/D/C, UTI sx, LE pain / swelling. Pt states she took home COVID test, w/ noted faint (+) line. Requesting PCR testing. Pt scheduled for spinal surgery in upcoming weeks.     PMH as above, meds as listed, NKDA, PSH spinal. + vaccinated against COVID x 3 doses.

## 2022-07-08 NOTE — ED PROVIDER NOTE - PATIENT PORTAL LINK FT
You can access the FollowMyHealth Patient Portal offered by [facility] by registering at the following website: [email or web address]. By joining [employer]’s FollowMyHealth portal, you will also be able to view your health information using other applications (apps) compatible with our system.

## 2022-08-09 ENCOUNTER — APPOINTMENT (OUTPATIENT)
Dept: ENDOCRINOLOGY | Facility: CLINIC | Age: 42
End: 2022-08-09

## 2022-08-09 VITALS
OXYGEN SATURATION: 98 % | HEIGHT: 71 IN | TEMPERATURE: 98 F | SYSTOLIC BLOOD PRESSURE: 122 MMHG | WEIGHT: 210 LBS | HEART RATE: 96 BPM | RESPIRATION RATE: 17 BRPM | DIASTOLIC BLOOD PRESSURE: 72 MMHG | BODY MASS INDEX: 29.4 KG/M2

## 2022-08-09 LAB — GLUCOSE BLDC GLUCOMTR-MCNC: 113

## 2022-08-09 PROCEDURE — 36415 COLL VENOUS BLD VENIPUNCTURE: CPT

## 2022-08-09 PROCEDURE — 82962 GLUCOSE BLOOD TEST: CPT

## 2022-08-09 PROCEDURE — 99214 OFFICE O/P EST MOD 30 MIN: CPT | Mod: 25

## 2022-08-09 NOTE — HISTORY OF PRESENT ILLNESS
[FreeTextEntry1] : F/u for diabetes management\par \par *** Aug 09, 2022 ***\par \par had another spinal fusion on 7/27/22- still with a cervical collar, but recovering well\par taking Tresiba 12 un HS, Novolog 15-15-15,  actos 30 mg qd, Ozempic 1 mg\par had Jardiance 25 mg added  about a month ago\par not checking FS, but reports subjective late pm hypo's\par states that had labs done about 2 weeks ago\par \par *** May 09, 2022 ***\par \par missed f/u appts\par taking Levemir 20 units HS, Novolog 30 to 38 un ac B and D only, Ozempic 0.5 mg qw\par not taking actos\par stopped using the sensor (had rash from the tape)\par Reports fbs 200-220, ppg 180's- 270's\par \par Thyr US (6/22/22)- Left isthmus cyst, 0.3 x 0.3 x 0.3 cm. There is a 0.5 x 0.4 x 0.6 cm cystic lesion just deep to the skin in the neck anterior to the right thyroid lobe. This is nonspecific but may represent a sebaceous cyst.\par \par \par *** May 17, 2021 ***\par \par Missed f/u in-person visits\par feels ok. s/p spinal fusion sx\par off Ozempic b/o pregnancy plans. had an ectopic pregnancy in January. now, decided against another pregnancy and wants to get back on ozempic\par \par taking Levemir 20 units HS, Novolog 15-0-15\par no recent labs\par reports fbs- 120's- 150's, p/B and p/D- mid 200's\par was using Dexcom G6, but stopped some time ago\par recalls being briefly on farxiga, but felt "gasy" on it\par \par *** Phone visit  Dec 07, 2020 ***\par \par started fertility evaluation. stopped actos and Tresiba\par taking Novolog 20 un tid ac, Ozempic 0.25 mg qw\par reports fbs - 110's, ppg- in mid 200's\par \par no recent labs\par \par HISTORY OF PRESENT ILLNESS. \par \par Ms. TAPIA was diagnosed with Diabetes Mellitus Type 2 in 2014 \par Reports history HTN, dyslipidemia, sarcoidosis. She denies CAD,  known complications of retinopathy, nephropathy, or neuropathy. \par She reports a GI intolerance on metformin, and is presently on Basaglar 20 un HS, Novolog 16-0-16. She was briefly on Victoza  (using samples) with some help, but is not taking right now. She was previously under care of Dr. Membreno.\par Blood sugars are checked 2-3 times a day. She reports FBS in low 200's, ppg- 240's- 260's\par Hypoglycemia frequency: none\par Diet: not following ADA\par Exercise: none\par \par Last dilated eye - 2019\par Last podiatry visit  - 2019\par Last cardiology evaluation - none\par Last stress test - none\par Last 2-D Echo - does not recall\par Last nephrology evaluation - none\par Last neurology evaluation- none\par \par

## 2022-08-09 NOTE — ASSESSMENT
[Diabetes Foot Care] : diabetes foot care [Long Term Vascular Complications] : long term vascular complications of diabetes [Carbohydrate Consistent Diet] : carbohydrate consistent diet [Importance of Diet and Exercise] : importance of diet and exercise to improve glycemic control, achieve weight loss and improve cardiovascular health [Exercise/Effect on Glucose] : exercise/effect on glucose [Hypoglycemia Management] : hypoglycemia management [Glucagon Use] : glucagon use [Ketone Testing] : ketone testing [Action and use of Insulin] : action and use of short and long-acting insulin [Self Monitoring of Blood Glucose] : self monitoring of blood glucose [Insulin Self-Administration] : insulin self-administration [Injection Technique, Storage, Sharps Disposal] : injection technique, storage, and sharps disposal [Sick-Day Management] : sick-day management [Retinopathy Screening] : Patient was referred to ophthalmology for retinopathy screening [Diabetic Medications] : Risks and benefits of diabetic medications were discussed [FreeTextEntry1] : 1. T2DM, uncontrolled\par - compliance to meds and f/u is reiterated\par - obtain most recent blood work from PCP and labs today\par - she wants to retry Dexcom and monitor for rash\par - at present, I need more information about her FS\par - since no plans for another pregnancy, cont Ozempic and increase to 2 mg qw\par - Tresiba 12 un HS, Novolog 15-15-10\par - actos 30 mg qd , Jardiance 25 mg qd\par - hypo's precautions\par - we reviewed insulin pumps and she'll discuss with Carmen\par - monitor lipids, blood pressure\par - ophthalmology evaluation\par \par 2. MNG\par - repeat  thyroid US once is done with a neck collar\par RTC 3 mos\par

## 2022-10-13 NOTE — ED ADULT TRIAGE NOTE - WEIGHT IN LBS
Detail Level: Detailed
Quality 111:Pneumonia Vaccination Status For Older Adults: Pneumococcal vaccine administered on or after patient’s 60th birthday and before the end of the measurement period
Quality 226: Preventive Care And Screening: Tobacco Use: Screening And Cessation Intervention: Patient screened for tobacco use and is an ex/non-smoker
Quality 130: Documentation Of Current Medications In The Medical Record: Current Medications Documented
Quality 110: Preventive Care And Screening: Influenza Immunization: Influenza Immunization Administered during Influenza season
Quality 47: Advance Care Plan: Advance Care Planning discussed and documented; advance care plan or surrogate decision maker documented in the medical record.
Quality 431: Preventive Care And Screening: Unhealthy Alcohol Use - Screening: Patient not identified as an unhealthy alcohol user when screened for unhealthy alcohol use using a systematic screening method
210.1

## 2022-10-25 RX ORDER — ERGOCALCIFEROL 1.25 MG/1
1.25 MG CAPSULE ORAL
Qty: 13 | Refills: 0 | Status: ACTIVE | COMMUNITY
Start: 2021-05-18 | End: 1900-01-01

## 2022-10-26 NOTE — CONSULT NOTE ADULT - SUBJECTIVE AND OBJECTIVE BOX
----- Message from Divine Fraser sent at 10/26/2022  1:56 PM CDT -----  Contact: María Daniel would like a call back at [phone], regards to if the office received her records from [facility].    Thanks  Td      
Patient has informed lab test result. Pended order for lab scheduled as per patient request.  
40F denies PMHx, seen at [facility] 8/13 for right sided paraspinal neck pain. At that time she received a CT of C Spine which depicted Calcification of the longus coli tendon with surrounding retropharyngeal and prevertebral fluid from C1 through C5, suggestive of acute tendinitis of the longus colli tendon. Large right paracentral disc bulge at C3/C4 resulting in mild impingement on the ventral cord and mild to moderate spinal canal stenosis. As well as tonsilar hypertrophy. The patient was given IV Unasyn in the ED and sent out to follow up with Dr. Bravo in the office for C spine HNP.   Patient reports she called Dr. Mcfarland office and said that she was having head aches / increased neck pain for which Dr. Bravo was concerned and told the patient to follow up in the ED for MRI as soon as possible.  Patient presented at [facility] ED today 8/18 for MRI.   MRI findings reveal:  There is calcific tendinitis of the right longus colli muscle with improvement in retropharyngeal edema as described. There is associated enhancement which is likely reactive. There are no discrete fluid collections.    At C3-C4 there is a large right paracentral disc extrusion which contributes to moderate central canal stenosis. There is prominent abnormal cord signal which may represent edema or myelomalacia.    While in the ED today, she was seen by ED attending who reported seeing tonsilar exudate and started the patient again o na dose of IV Unasyn.     Patient seen and examined by orthopedics team today - she reports that she has had a headache for previous few days but that she does not have a headache today. She reports that the neck pain that she has is on the right side of her neck and it does not radiate. She does not have any weakness/ numbness/ tingling/ gait instability. Denies any fevers/chills since 8/13. Denies any change in bowel or bladder symptoms Denies any photophobia     Vital Signs Last 24 Hrs  T(C): 37 (18 Aug 2020 08:45), Max: 37 (18 Aug 2020 08:45)  T(F): 98.6 (18 Aug 2020 08:45), Max: 98.6 (18 Aug 2020 08:45)  HR: 98 (18 Aug 2020 08:45) (98 - 98)  BP: 107/81 (18 Aug 2020 08:45) (107/81 - 107/81)  BP(mean): --  RR: 17 (18 Aug 2020 08:45) (17 - 17)  SpO2: 98% (18 Aug 2020 08:45) (98% - 98%)    LABS:                        10.2   12.71 )-----------( 639      ( 18 Aug 2020 10:01 )             33.7     18 Aug 2020 10:01    139    |  103    |  12     ----------------------------<  61     3.7     |  27     |  0.81     Ca    8.6        18 Aug 2020 10:01    TPro  8.0    /  Alb  3.2    /  TBili  0.2    /  DBili  x      /  AST  10     /  ALT  10     /  AlkPhos  76     18 Aug 2020 10:01    Imaging:   MRI C Spine: R Longus Coli calcification at C1/C2 level w/ improvement of edema compared to CT exam. C3/4 HNP with moderate canal stenosis    PE:  PHYSICAL EXAM  GEN: NAD, AAOx3    SPINE:  Skin intact  NTTP over the bony prominences of the cervical / thoracic / lumbar / sacral spine  + Paraspinal TTP of the cervical spine  No bony step-offs  Grossly moving all extremities    + Radial Pulse  + DP/PT Pulses    Motor:                          Deltoid       Biceps      Triceps      Wrist Ext      Finger Flex    Finger Abduction   RIGHT             5/5             5/5             5/5             5/5                 5/5                     5/5  LEFT                5/5             5/5             5/5             5/5                 5/5                     5/5                          Hip Flex       Knee Ext        Knee Flex     Dorsiflex      Hallux Ext        PlantarFlex  RIGHT            5/5                5/5                 5/5               5/5                 5/5                    5/5  LEFT               5/5                5/5                 5/5               5/5                 5/5                    5/5      Sensory:                      C5      C6      C7      C8       T1          RIGHT          2         2        2         2         2          (0=absent, 1=impaired, 2=normal, NT=not testable)  LEFT             2         2        2         2         2          (0=absent, 1=impaired, 2=normal, NT=not testable)                        L2        L3       L4      L5       S1          RIGHT        2          2         2        2        2           (0=absent, 1=impaired, 2=normal, NT=not testable)  LEFT           2          2         2        2        2           (0=absent, 1=impaired, 2=normal, NT=not testable)    Negative Espinal's sign bilaterally  Negative Babinski bilaterally   Negative Myoclonus bilaterally  Negative  and Release Test

## 2022-10-27 ENCOUNTER — RX RENEWAL (OUTPATIENT)
Age: 42
End: 2022-10-27

## 2022-10-27 RX ORDER — ERGOCALCIFEROL 1.25 MG/1
1.25 MG CAPSULE, LIQUID FILLED ORAL
Qty: 4 | Refills: 2 | Status: ACTIVE | COMMUNITY
Start: 2022-10-27 | End: 1900-01-01

## 2022-11-09 ENCOUNTER — APPOINTMENT (OUTPATIENT)
Dept: ENDOCRINOLOGY | Facility: CLINIC | Age: 42
End: 2022-11-09

## 2023-01-13 ENCOUNTER — EMERGENCY (EMERGENCY)
Facility: HOSPITAL | Age: 43
LOS: 0 days | Discharge: HOME | End: 2023-01-13
Attending: STUDENT IN AN ORGANIZED HEALTH CARE EDUCATION/TRAINING PROGRAM
Payer: COMMERCIAL

## 2023-01-13 VITALS
DIASTOLIC BLOOD PRESSURE: 76 MMHG | SYSTOLIC BLOOD PRESSURE: 116 MMHG | HEIGHT: 71 IN | TEMPERATURE: 99 F | OXYGEN SATURATION: 99 % | HEART RATE: 91 BPM | WEIGHT: 207.01 LBS | RESPIRATION RATE: 18 BRPM

## 2023-01-13 VITALS
SYSTOLIC BLOOD PRESSURE: 128 MMHG | HEART RATE: 72 BPM | OXYGEN SATURATION: 100 % | DIASTOLIC BLOOD PRESSURE: 80 MMHG | RESPIRATION RATE: 18 BRPM | TEMPERATURE: 99 F

## 2023-01-13 DIAGNOSIS — M54.50 LOW BACK PAIN, UNSPECIFIED: ICD-10-CM

## 2023-01-13 DIAGNOSIS — F41.9 ANXIETY DISORDER, UNSPECIFIED: ICD-10-CM

## 2023-01-13 DIAGNOSIS — E11.9 TYPE 2 DIABETES MELLITUS WITHOUT COMPLICATIONS: ICD-10-CM

## 2023-01-13 DIAGNOSIS — M54.2 CERVICALGIA: ICD-10-CM

## 2023-01-13 DIAGNOSIS — E78.5 HYPERLIPIDEMIA, UNSPECIFIED: ICD-10-CM
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
## 2023-01-13 DIAGNOSIS — Z79.4 LONG TERM (CURRENT) USE OF INSULIN: ICD-10-CM

## 2023-01-13 LAB — GLUCOSE BLDC GLUCOMTR-MCNC: 233 MG/DL — HIGH (ref 70–99)

## 2023-01-13 PROCEDURE — 72125 CT NECK SPINE W/O DYE: CPT | Mod: 26,MA

## 2023-01-13 PROCEDURE — 99284 EMERGENCY DEPT VISIT MOD MDM: CPT

## 2023-01-13 PROCEDURE — 70450 CT HEAD/BRAIN W/O DYE: CPT | Mod: 26,MA

## 2023-01-13 RX ORDER — LIDOCAINE 4 G/100G
1 CREAM TOPICAL ONCE
Refills: 0 | Status: COMPLETED | OUTPATIENT
Start: 2023-01-13 | End: 2023-01-13

## 2023-01-13 RX ORDER — METHOCARBAMOL 500 MG/1
1500 TABLET, FILM COATED ORAL ONCE
Refills: 0 | Status: COMPLETED | OUTPATIENT
Start: 2023-01-13 | End: 2023-01-13

## 2023-01-13 RX ORDER — IBUPROFEN 200 MG
600 TABLET ORAL ONCE
Refills: 0 | Status: COMPLETED | OUTPATIENT
Start: 2023-01-13 | End: 2023-01-13

## 2023-01-13 RX ORDER — ACETAMINOPHEN 500 MG
975 TABLET ORAL ONCE
Refills: 0 | Status: COMPLETED | OUTPATIENT
Start: 2023-01-13 | End: 2023-01-13

## 2023-01-13 RX ORDER — METHOCARBAMOL 500 MG/1
2 TABLET, FILM COATED ORAL
Qty: 12 | Refills: 0
Start: 2023-01-13 | End: 2023-01-14

## 2023-01-13 RX ORDER — OXYCODONE HYDROCHLORIDE 5 MG/1
5 TABLET ORAL ONCE
Refills: 0 | Status: DISCONTINUED | OUTPATIENT
Start: 2023-01-13 | End: 2023-01-13

## 2023-01-13 RX ADMIN — LIDOCAINE 1 PATCH: 4 CREAM TOPICAL at 15:10

## 2023-01-13 RX ADMIN — OXYCODONE HYDROCHLORIDE 5 MILLIGRAM(S): 5 TABLET ORAL at 10:38

## 2023-01-13 RX ADMIN — Medication 600 MILLIGRAM(S): at 10:39

## 2023-01-13 RX ADMIN — METHOCARBAMOL 1500 MILLIGRAM(S): 500 TABLET, FILM COATED ORAL at 15:11

## 2023-01-13 RX ADMIN — OXYCODONE HYDROCHLORIDE 5 MILLIGRAM(S): 5 TABLET ORAL at 11:39

## 2023-01-13 RX ADMIN — Medication 975 MILLIGRAM(S): at 11:39

## 2023-01-13 RX ADMIN — Medication 975 MILLIGRAM(S): at 10:39

## 2023-01-13 RX ADMIN — Medication 600 MILLIGRAM(S): at 11:39

## 2023-01-13 NOTE — ED PROVIDER NOTE - PATIENT PORTAL LINK FT
You can access the FollowMyHealth Patient Portal offered by [facility] by registering at the following website: [email or web address]. By joining [employer]’s FollowMyHealth portal, you will also be able to view your health information using other applications (apps) compatible with our system.

## 2023-01-13 NOTE — ED PROVIDER NOTE - CARE PROVIDER_API CALL
Nicole Bravo (DO)  Orthopaedic Surgery Surgery  [address] [facility], [address] [place] [address]  Phone: [phone]  Fax: [phone]  Follow Up Time:

## 2023-01-13 NOTE — ED PROVIDER NOTE - NSFOLLOWUPINSTRUCTIONS_ED_ALL_ED_FT
Rest, drink plenty of fluids  Advance activity as tolerated  Continue all previously prescribed medications as directed  Follow up with your PMD - bring copies of your results  Return to the ER for headache, numbness, weakness, or other new or concerning symptoms   For pain, you may take Tylenol 975mg every six hours and supplement with ibuprofen 600mg, with food, every six hours which can be taken three hours apart from the Tylenol to have a layered effect.  You may apply lidoderm patches to the affected area for 12 hours in a 24 hour period  Take methocarbamol every 8 hours as needed for spasm
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
## 2023-01-13 NOTE — ED PROVIDER NOTE - PROGRESS NOTE DETAILS
Navjot: CT negative for acute pathology.  Patient removed c collar previously placed on her own prior to scan.  Currently with improved pain and no focal deficits.  Ambulating, will dc.
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
## 2023-01-13 NOTE — ED PROVIDER NOTE - PHYSICAL EXAMINATION
General appearance: Nontoxic appearing, conversant, afebrile    Eyes: anicteric sclerae, CONCHITA, EOMI   HENT: Atraumatic; oropharynx clear, MMM and no ulcerations, no pharyngeal erythema or exudate   Neck: Trachea midline; Full range of motion, supple, c collar in place   Pulm: CTA bl, normal respiratory effort and no intercostal retractions, normal work of breathing   CV: RRR, No murmurs, rubs, or gallops   Abdomen: Soft, non-tender, non-distended; no guarding or rebound   Back: No midline ttp, step offs, deformities, no cvat, lumbar paraspinal ttp   Extremities: No peripheral edema, no gross deformities, FROM x4, 5/5 MS x4, gross sensation intact    Skin: Dry, normal temperature, turgor and texture; no rash   Psych: Appropriate affect, cooperative
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
## 2023-01-13 NOTE — ED PROVIDER NOTE - CARE PLAN
Principal Discharge DX:	Neck pain   1 Principal Discharge DX:	Neck pain  Secondary Diagnosis:	Anxiety about health

## 2023-01-13 NOTE — ED ADULT NURSE NOTE - CHPI ED NUR SYMPTOMS NEG
no acting out behaviors/no bruising/no crying/no decreased eating/drinking/no difficulty bearing weight/no disorientation/no fussiness/no laceration/no sleeping issues

## 2023-01-13 NOTE — ED ADULT NURSE NOTE - NSFALLRSKASSESASSIST_ED_ALL_ED
Results for Flory Thornton (MRN [id number]) as of 9/22/2017 08:29   Ref. Range 9/15/2017 06:08   HGB Latest Ref Range: 11.7 - 15.4 g/dL 11.3 (L)     Results for Flory Thornton (MRN [id number]) as of 9/22/2017 08:29   Ref. Range 9/14/2017 21:57 9/15/2017 06:08 9/15/2017 07:57   Sodium Latest Ref Range: 136 - 145 mmol/L  136    Potassium Latest Ref Range: 3.5 - 5.1 mmol/L  4.3    Chloride Latest Ref Range: 98 - 107 mmol/L  101    CO2 Latest Ref Range: 21 - 32 mmol/L  27    Anion gap Latest Ref Range: 7 - 16 mmol/L  8    Glucose Latest Ref Range: 65 - 100 mg/dL  149 (H)    BUN Latest Ref Range: 8 - 23 MG/DL  16    Creatinine Latest Ref Range: 0.6 - 1.0 MG/DL  1.20 (H)    Calcium Latest Ref Range: 8.3 - 10.4 MG/DL  9.8    GFR est non-AA Latest Ref Range: >60 ml/min/1.73m2  46 (L)    GFR est AA Latest Ref Range: >60 ml/min/1.73m2  55 (L)      Routed to [employer].   anesthesia to review no
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
## 2023-02-09 ENCOUNTER — APPOINTMENT (OUTPATIENT)
Dept: ENDOCRINOLOGY | Facility: CLINIC | Age: 43
End: 2023-02-09

## 2023-05-09 ENCOUNTER — APPOINTMENT (OUTPATIENT)
Dept: ENDOCRINOLOGY | Facility: CLINIC | Age: 43
End: 2023-05-09
Payer: COMMERCIAL

## 2023-05-09 VITALS
BODY MASS INDEX: 30.8 KG/M2 | HEIGHT: 71 IN | TEMPERATURE: 97.8 F | OXYGEN SATURATION: 98 % | HEART RATE: 98 BPM | WEIGHT: 220 LBS | DIASTOLIC BLOOD PRESSURE: 70 MMHG | SYSTOLIC BLOOD PRESSURE: 124 MMHG

## 2023-05-09 LAB — GLUCOSE BLDC GLUCOMTR-MCNC: 290

## 2023-05-09 PROCEDURE — 99214 OFFICE O/P EST MOD 30 MIN: CPT | Mod: 25

## 2023-05-09 PROCEDURE — 36415 COLL VENOUS BLD VENIPUNCTURE: CPT

## 2023-05-09 PROCEDURE — 82962 GLUCOSE BLOOD TEST: CPT

## 2023-05-09 NOTE — HISTORY OF PRESENT ILLNESS
[FreeTextEntry1] : F/u for diabetes management\par \par *** May 09, 2023 ***\par \par changed her jobs. was off insurance for some time\par ran out of most of her meds a month or two ago\par presently only taking Jardiance 25 mg qd, occasional novolog\par taking  Tresiba 12 un HS, Novolog 15-15-10,  actos 30 mg qd , \par \par *** Aug 09, 2022 ***\par \par had another spinal fusion on 7/27/22- still with a cervical collar, but recovering well\par taking Tresiba 12 un HS, Novolog 15-15-15,  actos 30 mg qd, Ozempic 1 mg\par had Jardiance 25 mg added  about a month ago\par not checking FS, but reports subjective late pm hypo's\par states that had labs done about 2 weeks ago\par \par *** May 09, 2022 ***\par \par missed f/u appts\par taking Levemir 20 units HS, Novolog 30 to 38 un ac B and D only, Ozempic 0.5 mg qw\par not taking actos\par stopped using the sensor (had rash from the tape)\par Reports fbs 200-220, ppg 180's- 270's\par \par Thyr US (6/22/22)- Left isthmus cyst, 0.3 x 0.3 x 0.3 cm. There is a 0.5 x 0.4 x 0.6 cm cystic lesion just deep to the skin in the neck anterior to the right thyroid lobe. This is nonspecific but may represent a sebaceous cyst.\par \par \par *** May 17, 2021 ***\par \par Missed f/u in-person visits\par feels ok. s/p spinal fusion sx\par off Ozempic b/o pregnancy plans. had an ectopic pregnancy in January. now, decided against another pregnancy and wants to get back on ozempic\par \par taking Levemir 20 units HS, Novolog 15-0-15\par no recent labs\par reports fbs- 120's- 150's, p/B and p/D- mid 200's\par was using Dexcom G6, but stopped some time ago\par recalls being briefly on farxiga, but felt "gasy" on it\par \par *** Phone visit  Dec 07, 2020 ***\par \par started fertility evaluation. stopped actos and Tresiba\par taking Novolog 20 un tid ac, Ozempic 0.25 mg qw\par reports fbs - 110's, ppg- in mid 200's\par \par no recent labs\par \par HISTORY OF PRESENT ILLNESS. \par \par Ms. TAPIA was diagnosed with Diabetes Mellitus Type 2 in 2014 \par Reports history HTN, dyslipidemia, sarcoidosis. She denies CAD,  known complications of retinopathy, nephropathy, or neuropathy. \par She reports a GI intolerance on metformin, and is presently on Basaglar 20 un HS, Novolog 16-0-16. She was briefly on Victoza  (using samples) with some help, but is not taking right now. She was previously under care of Dr. Membreno.\par Blood sugars are checked 2-3 times a day. She reports FBS in low 200's, ppg- 240's- 260's\par Hypoglycemia frequency: none\par Diet: not following ADA\par Exercise: none\par \par Last dilated eye - 4/23\par Last podiatry visit  - 11/22\par Last cardiology evaluation - none\par Last stress test - none\par Last 2-D Echo - does not recall\par Last nephrology evaluation - none\par Last neurology evaluation- none\par \par

## 2023-05-09 NOTE — ASSESSMENT
[Diabetes Foot Care] : diabetes foot care [Long Term Vascular Complications] : long term vascular complications of diabetes [Carbohydrate Consistent Diet] : carbohydrate consistent diet [Importance of Diet and Exercise] : importance of diet and exercise to improve glycemic control, achieve weight loss and improve cardiovascular health [Exercise/Effect on Glucose] : exercise/effect on glucose [Hypoglycemia Management] : hypoglycemia management [Glucagon Use] : glucagon use [Ketone Testing] : ketone testing [Action and use of Insulin] : action and use of short and long-acting insulin [Self Monitoring of Blood Glucose] : self monitoring of blood glucose [Insulin Self-Administration] : insulin self-administration [Injection Technique, Storage, Sharps Disposal] : injection technique, storage, and sharps disposal [Sick-Day Management] : sick-day management [Retinopathy Screening] : Patient was referred to ophthalmology for retinopathy screening [Diabetic Medications] : Risks and benefits of diabetic medications were discussed [FreeTextEntry1] : 1. T2DM, uncontrolled\par - compliance to meds and f/u is reiterated\par - labs today\par - she wants to retry Dexcom and monitor for rash\par - at present, I need more information about her FS\par - since no plans for another pregnancy, cont \par - resume Tresiba 12 un HS, Novolog 15-15-10\par - actos 30 mg qd , Jardiance 25 mg qd\par - Ozempic 0.5 mg qw and uptitrate\par - resume Crestor 40 mg HS\par - restart Jose \par - hypo's precautions\par - we reviewed insulin pumps and she'll discuss with Carmen\par - monitor lipids, blood pressure\par - ophthalmology evaluation\par \par 2. MNG\par - repeat  thyroid US\par RTC 3 mos\par

## 2023-05-15 ENCOUNTER — NON-APPOINTMENT (OUTPATIENT)
Age: 43
End: 2023-05-15

## 2023-05-22 RX ORDER — PEN NEEDLE, DIABETIC 32GX 5/32"
32G X 4 MM NEEDLE, DISPOSABLE MISCELLANEOUS
Qty: 300 | Refills: 3 | Status: ACTIVE | COMMUNITY
Start: 2023-05-22 | End: 1900-01-01

## 2023-05-24 RX ORDER — INSULIN ASPART 100 [IU]/ML
100 INJECTION, SOLUTION INTRAVENOUS; SUBCUTANEOUS
Qty: 6 | Refills: 3 | Status: DISCONTINUED | COMMUNITY
End: 2023-05-22
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
## 2023-08-09 ENCOUNTER — APPOINTMENT (OUTPATIENT)
Dept: ENDOCRINOLOGY | Facility: CLINIC | Age: 43
End: 2023-08-09
Payer: COMMERCIAL

## 2023-08-09 VITALS
DIASTOLIC BLOOD PRESSURE: 72 MMHG | RESPIRATION RATE: 16 BRPM | TEMPERATURE: 97.6 F | HEIGHT: 71 IN | OXYGEN SATURATION: 98 % | SYSTOLIC BLOOD PRESSURE: 122 MMHG | WEIGHT: 222 LBS | HEART RATE: 71 BPM | BODY MASS INDEX: 31.08 KG/M2

## 2023-08-09 LAB — GLUCOSE BLDC GLUCOMTR-MCNC: 238

## 2023-08-09 PROCEDURE — 99214 OFFICE O/P EST MOD 30 MIN: CPT | Mod: 25

## 2023-08-09 PROCEDURE — 82962 GLUCOSE BLOOD TEST: CPT

## 2023-08-09 NOTE — ASSESSMENT
[Diabetes Foot Care] : diabetes foot care [Long Term Vascular Complications] : long term vascular complications of diabetes [Carbohydrate Consistent Diet] : carbohydrate consistent diet [Importance of Diet and Exercise] : importance of diet and exercise to improve glycemic control, achieve weight loss and improve cardiovascular health [Exercise/Effect on Glucose] : exercise/effect on glucose [Hypoglycemia Management] : hypoglycemia management [Glucagon Use] : glucagon use [Ketone Testing] : ketone testing [Action and use of Insulin] : action and use of short and long-acting insulin [Self Monitoring of Blood Glucose] : self monitoring of blood glucose [Insulin Self-Administration] : insulin self-administration [Injection Technique, Storage, Sharps Disposal] : injection technique, storage, and sharps disposal [Sick-Day Management] : sick-day management [Retinopathy Screening] : Patient was referred to ophthalmology for retinopathy screening [Diabetic Medications] : Risks and benefits of diabetic medications were discussed [FreeTextEntry1] : 1. T2DM, uncontrolled - compliance to meds and f/u is reiterated - obtain most recent labs - strongly advised that preconception A1C goal is <6.5 percent - I reviewed potential fetal and  complications ( from mild such as large for gestational age  to lethal (higher risk of early pregnancy loss, some congenital malformations, and stillbirth)). The risk of these complications is directly related to glycemic control throughout pregnancy. The likelihood of worsening microvascular complications in pregnancy is related to duration of diabetes. - I've also advised that we need to stop GLP1RA and SGLT2i at least 2 months before conception. We'll stop Actos as well  - patient agreed to start the pump process (Tandem Tslim) and retry Dexcom G6 and monitor for rash - for now , increase Tresiba 14 un HS, Humalog 15-15-15 - actos 30 mg qd , Jardiance 25 mg qd - Ozempic 0.5 mg qw - Crestor 40 mg HS for now (would need to be stopped before pregnancy) - We discussed that she'd need to establish at M[facility]/ high risk ob, and will be managed by them during her pregnancy - hypo's precautions - we reviewed insulin pumps and she'll discuss with Carmen - monitor lipids, blood pressure - ophthalmology evaluation  2. MNG - tiny subcm non- suspicious cystic nodule - repeat  thyroid US prn RTC 3 mos, or sooner prn

## 2023-08-09 NOTE — HISTORY OF PRESENT ILLNESS
[FreeTextEntry1] : F/u for diabetes management  *** Aug 09, 2023 ***  feels better. being evaluated for IVF inquiring about clearance , but reports last a1c about 4-6 weeks ago- 7.3% taking Tresiba 14 un HS, Novolog 12 un tid ac meals,  actos 30 mg qd , Jardiance 25 mg qd,  Ozempic 0.5 mg qw reports fbs - 120's, ppg- low 200's  Thyr US (5/12/23)- RUP cystic 0.3 + 0.3 (TR1)  *** May 09, 2023 ***  changed her jobs. was off insurance for some time ran out of most of her meds a month or two ago presently only taking Jardiance 25 mg qd, occasional novolog taking  Tresiba 12 un HS, Novolog 15-15-10,  actos 30 mg qd ,   *** Aug 09, 2022 ***  had another spinal fusion on 7/27/22- still with a cervical collar, but recovering well taking Tresiba 12 un HS, Novolog 15-15-15,  actos 30 mg qd, Ozempic 1 mg had Jardiance 25 mg added  about a month ago not checking FS, but reports subjective late pm hypo's states that had labs done about 2 weeks ago  *** May 09, 2022 ***  missed f/u appts taking Levemir 20 units HS, Novolog 30 to 38 un ac B and D only, Ozempic 0.5 mg qw not taking actos stopped using the sensor (had rash from the tape) Reports fbs 200-220, ppg 180's- 270's  Thyr US (6/22/22)- Left isthmus cyst, 0.3 x 0.3 x 0.3 cm. There is a 0.5 x 0.4 x 0.6 cm cystic lesion just deep to the skin in the neck anterior to the right thyroid lobe. This is nonspecific but may represent a sebaceous cyst.   *** May 17, 2021 ***  Missed f/u in-person visits feels ok. s/p spinal fusion sx off Ozempic b/o pregnancy plans. had an ectopic pregnancy in January. now, decided against another pregnancy and wants to get back on ozempic  taking Levemir 20 units HS, Novolog 15-0-15 no recent labs reports fbs- 120's- 150's, p/B and p/D- mid 200's was using Dexcom G6, but stopped some time ago recalls being briefly on farxiga, but felt "gasy" on it  *** Phone visit  Dec 07, 2020 ***  started fertility evaluation. stopped actos and Tresiba taking Novolog 20 un tid ac, Ozempic 0.25 mg qw reports fbs - 110's, ppg- in mid 200's  no recent labs  HISTORY OF PRESENT ILLNESS.   Ms. TAPIA was diagnosed with Diabetes Mellitus Type 2 in 2014  Reports history HTN, dyslipidemia, sarcoidosis. She denies CAD,  known complications of retinopathy, nephropathy, or neuropathy.  She reports a GI intolerance on metformin, and is presently on Basaglar 20 un HS, Novolog 16-0-16. She was briefly on Victoza  (using samples) with some help, but is not taking right now. She was previously under care of Dr. Membreno. Blood sugars are checked 2-3 times a day. She reports FBS in low 200's, ppg- 240's- 260's Hypoglycemia frequency: none Diet: not following ADA Exercise: none  Last dilated eye - 4/23 Last podiatry visit  - 11/22 Last cardiology evaluation - none Last stress test - none Last 2-D Echo - does not recall Last nephrology evaluation - none Last neurology evaluation- none

## 2023-09-21 ENCOUNTER — EMERGENCY (EMERGENCY)
Facility: HOSPITAL | Age: 43
LOS: 0 days | Discharge: HOME | End: 2023-09-21
Payer: COMMERCIAL

## 2023-09-21 VITALS
TEMPERATURE: 99 F | OXYGEN SATURATION: 100 % | SYSTOLIC BLOOD PRESSURE: 138 MMHG | RESPIRATION RATE: 18 BRPM | DIASTOLIC BLOOD PRESSURE: 87 MMHG | HEART RATE: 69 BPM

## 2023-09-21 VITALS
OXYGEN SATURATION: 99 % | DIASTOLIC BLOOD PRESSURE: 88 MMHG | SYSTOLIC BLOOD PRESSURE: 130 MMHG | WEIGHT: 210.1 LBS | HEIGHT: 71 IN | HEART RATE: 87 BPM | RESPIRATION RATE: 19 BRPM | TEMPERATURE: 98 F

## 2023-09-21 DIAGNOSIS — Z98.1 ARTHRODESIS STATUS: ICD-10-CM

## 2023-09-21 DIAGNOSIS — M54.2 CERVICALGIA: ICD-10-CM

## 2023-09-21 DIAGNOSIS — R07.89 OTHER CHEST PAIN: ICD-10-CM

## 2023-09-21 DIAGNOSIS — Z79.85 LONG-TERM (CURRENT) USE OF INJECTABLE NON-INSULIN ANTIDIABETIC DRUGS: ICD-10-CM

## 2023-09-21 DIAGNOSIS — M25.511 PAIN IN RIGHT SHOULDER: ICD-10-CM
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
## 2023-09-21 DIAGNOSIS — S16.1XXA STRAIN OF MUSCLE, FASCIA AND TENDON AT NECK LEVEL, INITIAL ENCOUNTER: ICD-10-CM
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
## 2023-09-21 DIAGNOSIS — E78.5 HYPERLIPIDEMIA, UNSPECIFIED: ICD-10-CM

## 2023-09-21 DIAGNOSIS — Z79.4 LONG TERM (CURRENT) USE OF INSULIN: ICD-10-CM

## 2023-09-21 DIAGNOSIS — Z87.39 PERSONAL HISTORY OF OTHER DISEASES OF THE MUSCULOSKELETAL SYSTEM AND CONNECTIVE TISSUE: ICD-10-CM

## 2023-09-21 DIAGNOSIS — E11.9 TYPE 2 DIABETES MELLITUS WITHOUT COMPLICATIONS: ICD-10-CM

## 2023-09-21 PROCEDURE — 99053 MED SERV 10PM-8AM 24 HR FAC: CPT

## 2023-09-21 PROCEDURE — 72125 CT NECK SPINE W/O DYE: CPT | Mod: 26,MA

## 2023-09-21 PROCEDURE — 93010 ELECTROCARDIOGRAM REPORT: CPT

## 2023-09-21 PROCEDURE — 71046 X-RAY EXAM CHEST 2 VIEWS: CPT | Mod: 26

## 2023-09-21 PROCEDURE — 99285 EMERGENCY DEPT VISIT HI MDM: CPT

## 2023-09-21 PROCEDURE — 73030 X-RAY EXAM OF SHOULDER: CPT | Mod: 26,RT

## 2023-09-21 RX ORDER — LIDOCAINE 4 G/100G
1 CREAM TOPICAL ONCE
Refills: 0 | Status: COMPLETED | OUTPATIENT
Start: 2023-09-21 | End: 2023-09-21

## 2023-09-21 RX ORDER — LIDOCAINE 4 G/100G
1 CREAM TOPICAL
Qty: 1 | Refills: 0
Start: 2023-09-21 | End: 2023-09-25

## 2023-09-21 RX ORDER — METHOCARBAMOL 500 MG/1
1 TABLET, FILM COATED ORAL
Qty: 9 | Refills: 0
Start: 2023-09-21 | End: 2023-09-23

## 2023-09-21 RX ORDER — IBUPROFEN 200 MG
600 TABLET ORAL ONCE
Refills: 0 | Status: COMPLETED | OUTPATIENT
Start: 2023-09-21 | End: 2023-09-21

## 2023-09-21 RX ADMIN — Medication 600 MILLIGRAM(S): at 09:48

## 2023-09-21 RX ADMIN — LIDOCAINE 1 PATCH: 4 CREAM TOPICAL at 10:22

## 2023-09-21 NOTE — ED ADULT NURSE NOTE - NSFALLRISKINTERV_ED_ALL_ED
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
## 2023-09-21 NOTE — ED PROVIDER NOTE - PHYSICAL EXAMINATION
GEN: Awake, alert, interactive, NAD.  HEAD AND NECK: NC/AT. Airway patent. Neck supple. No c spine midline tenderness, (+) mild right paraspinal tenderness, (-) bruits  EYES:  Clear b/l. EOMI. PERRL.   ENT: Moist mucus membranes.   CARDIAC: Regular rate, regular rhythm. No evident pedal edema.    RESP/CHEST: Normal respiratory effort with no use of accessory muscles or retractions. Clear throughout on auscultation. (+) mild right sided chest wall tenderness, no crepitus, no bruising.   ABD: soft, non-distended, non-tender. No rebound, no guarding.   BACK: No midline spinal TTP. No CVAT.   EXTREMITIES: RUE: No deformity, (+) FROM of the right shoulder, elbow, wrist, (+) pulses intact.  Moving all other extremities with no apparent deformities.   SKIN: Warm, dry, intact normal color. No rash.   NEURO: AOx3, CN II-XII grossly intact, no focal deficits. Ambulatory with steady gait.   PSYCH: Appropriate mood and affect.

## 2023-09-21 NOTE — ED ADULT NURSE NOTE - NSHOSCREENINGQ1_ED_ALL_ED
Bedside and Verbal shift change report given to [address] [place] [address] (oncoming nurse) by Sid Ricardo (offgoing nurse). Report included the following information SBAR, Kardex, ED Summary, OR Summary, Procedure Summary, Intake/Output, MAR, Recent Results, and Med Rec Status. No
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
## 2023-09-21 NOTE — ED PROVIDER NOTE - NSFOLLOWUPINSTRUCTIONS_ED_ALL_ED_FT

## 2023-09-21 NOTE — ED ADULT TRIAGE NOTE - ACCOMPANIED BY
Self 66 y/o M with a PMHx of tongue CA, HLD, hypothyroid presents to the ED regarding suture removal. Last week pt was working with his hands and a blade when he slipped and cut his L thumb. Pt was seen in [facility] and had sutures placed. Pt returns today for suture removal. No other complaints at this time. Notes that he is at 8 days but is going to [place] and wants sutures out early.

## 2023-09-21 NOTE — ED PROVIDER NOTE - PATIENT PORTAL LINK FT
You can access the FollowMyHealth Patient Portal offered by [facility] by registering at the following website: [email or web address]. By joining [facility]’s FollowMyHealth portal, you will also be able to view your health information using other applications (apps) compatible with our system.

## 2023-09-21 NOTE — ED PROVIDER NOTE - NS ED ROS FT
CONSTITUTIONAL: No fever, no chills, no fatigue  EYES: No visual changes  ENT: No ear pain, no sore throat  CARDIOVASCULAR: no palpitations  RESPIRATORY: No cough, no SOB  GI: No abdominal pain, no nausea, no vomiting, no constipation, no diarrhea  GENITOURINARY: No dysuria, no frequency, no hematuria  MUSKULOSKELETAL: No backpain.   SKIN: No rash  NEURO: No headache    ALL OTHER SYSTEMS NEGATIVE.
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
## 2023-11-02 ENCOUNTER — RX RENEWAL (OUTPATIENT)
Age: 43
End: 2023-11-02

## 2024-01-24 ENCOUNTER — APPOINTMENT (OUTPATIENT)
Dept: ENDOCRINOLOGY | Facility: CLINIC | Age: 44
End: 2024-01-24

## 2024-02-01 ENCOUNTER — APPOINTMENT (OUTPATIENT)
Dept: ENDOCRINOLOGY | Facility: CLINIC | Age: 44
End: 2024-02-01
Payer: COMMERCIAL

## 2024-02-01 VITALS
SYSTOLIC BLOOD PRESSURE: 122 MMHG | DIASTOLIC BLOOD PRESSURE: 70 MMHG | BODY MASS INDEX: 29.96 KG/M2 | HEIGHT: 71 IN | HEART RATE: 70 BPM | WEIGHT: 214 LBS | OXYGEN SATURATION: 99 % | RESPIRATION RATE: 16 BRPM | TEMPERATURE: 97.3 F

## 2024-02-01 DIAGNOSIS — E04.2 NONTOXIC MULTINODULAR GOITER: ICD-10-CM

## 2024-02-01 LAB — GLUCOSE BLDC GLUCOMTR-MCNC: 320

## 2024-02-01 PROCEDURE — 36415 COLL VENOUS BLD VENIPUNCTURE: CPT

## 2024-02-01 PROCEDURE — 99214 OFFICE O/P EST MOD 30 MIN: CPT | Mod: 25

## 2024-02-01 PROCEDURE — 82962 GLUCOSE BLOOD TEST: CPT

## 2024-02-01 NOTE — HISTORY OF PRESENT ILLNESS
[FreeTextEntry1] : F/u for diabetes management  *** Feb 01, 2024 ***  decided against insulin pump. no more plans for pregnancy ran out of Ozempic about 2 months ago, and Humalog 2 weeks ago on Tresiba 16 un HS,   actos 30 mg qd , Jardiance 25 mg qd,  Crestor 40 mg HS no recent labs reports fbs low 200's, ppg-  in 300s  *** Aug 09, 2023 ***  feels better. being evaluated for IVF inquiring about clearance , but reports last a1c about 4-6 weeks ago- 7.3% taking Tresiba 14 un HS, Novolog 12 un tid ac meals,  actos 30 mg qd , Jardiance 25 mg qd,  Ozempic 0.5 mg qw reports fbs - 120's, ppg- low 200's  Thyr US (5/12/23)- RUP cystic 0.3 + 0.3 (TR1)  *** May 09, 2023 ***  changed her jobs. was off insurance for some time ran out of most of her meds a month or two ago presently only taking Jardiance 25 mg qd, occasional novolog taking  Tresiba 12 un HS, Novolog 15-15-10,  actos 30 mg qd ,   *** Aug 09, 2022 ***  had another spinal fusion on 7/27/22- still with a cervical collar, but recovering well taking Tresiba 12 un HS, Novolog 15-15-15,  actos 30 mg qd, Ozempic 1 mg had Jardiance 25 mg added  about a month ago not checking FS, but reports subjective late pm hypo's states that had labs done about 2 weeks ago  *** May 09, 2022 ***  missed f/u appts taking Levemir 20 units HS, Novolog 30 to 38 un ac B and D only, Ozempic 0.5 mg qw not taking actos stopped using the sensor (had rash from the tape) Reports fbs 200-220, ppg 180's- 270's  Thyr US (6/22/22)- Left isthmus cyst, 0.3 x 0.3 x 0.3 cm. There is a 0.5 x 0.4 x 0.6 cm cystic lesion just deep to the skin in the neck anterior to the right thyroid lobe. This is nonspecific but may represent a sebaceous cyst.   *** May 17, 2021 ***  Missed f/u in-person visits feels ok. s/p spinal fusion sx off Ozempic b/o pregnancy plans. had an ectopic pregnancy in January. now, decided against another pregnancy and wants to get back on ozempic  taking Levemir 20 units HS, Novolog 15-0-15 no recent labs reports fbs- 120's- 150's, p/B and p/D- mid 200's was using Dexcom G6, but stopped some time ago recalls being briefly on farxiga, but felt "gasy" on it  *** Phone visit  Dec 07, 2020 ***  started fertility evaluation. stopped actos and Tresiba taking Novolog 20 un tid ac, Ozempic 0.25 mg qw reports fbs - 110's, ppg- in mid 200's  no recent labs  HISTORY OF PRESENT ILLNESS.   Ms. TAPIA was diagnosed with Diabetes Mellitus Type 2 in 2014  Reports history HTN, dyslipidemia, sarcoidosis. She denies CAD,  known complications of retinopathy, nephropathy, or neuropathy.  She reports a GI intolerance on metformin, and is presently on Basaglar 20 un HS, Novolog 16-0-16. She was briefly on Victoza  (using samples) with some help, but is not taking right now. She was previously under care of Dr. Membreno. Blood sugars are checked 2-3 times a day. She reports FBS in low 200's, ppg- 240's- 260's Hypoglycemia frequency: none Diet: not following ADA Exercise: none  Last dilated eye - 4/23 Last podiatry visit  - 11/22 Last cardiology evaluation - none Last stress test - none Last 2-D Echo - does not recall Last nephrology evaluation - none Last neurology evaluation- none

## 2024-02-01 NOTE — ASSESSMENT
[Diabetes Foot Care] : diabetes foot care [Long Term Vascular Complications] : long term vascular complications of diabetes [Carbohydrate Consistent Diet] : carbohydrate consistent diet [Importance of Diet and Exercise] : importance of diet and exercise to improve glycemic control, achieve weight loss and improve cardiovascular health [Exercise/Effect on Glucose] : exercise/effect on glucose [Hypoglycemia Management] : hypoglycemia management [Glucagon Use] : glucagon use [Ketone Testing] : ketone testing [Action and use of Insulin] : action and use of short and long-acting insulin [Self Monitoring of Blood Glucose] : self monitoring of blood glucose [Insulin Self-Administration] : insulin self-administration [Injection Technique, Storage, Sharps Disposal] : injection technique, storage, and sharps disposal [Sick-Day Management] : sick-day management [Retinopathy Screening] : Patient was referred to ophthalmology for retinopathy screening [Diabetic Medications] : Risks and benefits of diabetic medications were discussed [FreeTextEntry1] : 1. T2DM, uncontrolled - compliance to meds and f/u is reiterated - obtain most recent labs - strongly advised that preconception A1C goal is <6.5 percent - I reviewed potential fetal and  complications ( from mild such as large for gestational age to lethal (higher risk of early pregnancy loss, some congenital malformations, and stillbirth)). The risk of these complications is directly related to glycemic control throughout pregnancy. The likelihood of worsening microvascular complications in pregnancy is related to duration of diabetes. - I've also advised that we need to stop GLP1RA and SGLT2i at least 2 months before conception. We'll stop Actos as well - patient declines the pump b/o the cost - for now , increase Tresiba 26 un HS, resume Humalog 15-15-15 - actos 30 mg qd , Jardiance 25 mg qd - switch to Mounjaro 2.5 mg qw and uptitrate - Crestor 40 mg HS for now (would need to be stopped before pregnancy) - We discussed that she'd need to establish at M/ high risk ob, and will be managed by them during her pregnancy - hypo's precautions - monitor lipids, blood pressure - ophthalmology evaluation  2. MNG - tiny subcm non- suspicious cystic nodule - repeat thyroid US prn RTC 6 wks with our endo NP and 3 mos with me

## 2024-02-03 LAB
ALBUMIN SERPL ELPH-MCNC: 4.1 G/DL
ALP BLD-CCNC: 92 U/L
ALT SERPL-CCNC: 11 U/L
ANION GAP SERPL CALC-SCNC: 16 MMOL/L
AST SERPL-CCNC: 15 U/L
BILIRUB SERPL-MCNC: <0.2 MG/DL
BUN SERPL-MCNC: 16 MG/DL
CALCIUM SERPL-MCNC: 9.5 MG/DL
CHLORIDE SERPL-SCNC: 96 MMOL/L
CHOLEST SERPL-MCNC: 287 MG/DL
CO2 SERPL-SCNC: 22 MMOL/L
CREAT SERPL-MCNC: 0.96 MG/DL
EGFR: 74 ML/MIN/1.73M2
ESTIMATED AVERAGE GLUCOSE: 324 MG/DL
FOLATE SERPL-MCNC: 18.8 NG/ML
FRUCTOSAMINE SERPL-MCNC: 456 UMOL/L
GLUCOSE SERPL-MCNC: 304 MG/DL
HBA1C MFR BLD HPLC: 12.9 %
HDLC SERPL-MCNC: 83 MG/DL
LDLC SERPL CALC-MCNC: 172 MG/DL
NONHDLC SERPL-MCNC: 204 MG/DL
POTASSIUM SERPL-SCNC: 4.4 MMOL/L
PROT SERPL-MCNC: 7.4 G/DL
SODIUM SERPL-SCNC: 135 MMOL/L
T4 FREE SERPL-MCNC: 1.3 NG/DL
TRIGL SERPL-MCNC: 178 MG/DL
TSH SERPL-ACNC: 1.5 UIU/ML
VIT B12 SERPL-MCNC: 964 PG/ML

## 2024-02-14 RX ORDER — TIRZEPATIDE 2.5 MG/.5ML
2.5 INJECTION, SOLUTION SUBCUTANEOUS
Qty: 1 | Refills: 5 | Status: ACTIVE | COMMUNITY
Start: 2024-02-01 | End: 1900-01-01

## 2024-02-29 ENCOUNTER — APPOINTMENT (OUTPATIENT)
Dept: ORTHOPEDIC SURGERY | Facility: CLINIC | Age: 44
End: 2024-02-29

## 2024-03-13 ENCOUNTER — APPOINTMENT (OUTPATIENT)
Dept: ENDOCRINOLOGY | Facility: CLINIC | Age: 44
End: 2024-03-13
Payer: COMMERCIAL

## 2024-03-13 VITALS
DIASTOLIC BLOOD PRESSURE: 82 MMHG | OXYGEN SATURATION: 98 % | WEIGHT: 211 LBS | TEMPERATURE: 98.1 F | BODY MASS INDEX: 29.54 KG/M2 | SYSTOLIC BLOOD PRESSURE: 121 MMHG | HEART RATE: 106 BPM | RESPIRATION RATE: 16 BRPM | HEIGHT: 71 IN

## 2024-03-13 PROCEDURE — 82962 GLUCOSE BLOOD TEST: CPT

## 2024-03-13 PROCEDURE — 99214 OFFICE O/P EST MOD 30 MIN: CPT | Mod: 25

## 2024-03-18 ENCOUNTER — APPOINTMENT (OUTPATIENT)
Dept: ORTHOPEDIC SURGERY | Facility: CLINIC | Age: 44
End: 2024-03-18

## 2024-03-18 NOTE — ASSESSMENT
[Diabetes Foot Care] : diabetes foot care [Carbohydrate Consistent Diet] : carbohydrate consistent diet [Long Term Vascular Complications] : long term vascular complications of diabetes [Exercise/Effect on Glucose] : exercise/effect on glucose [Importance of Diet and Exercise] : importance of diet and exercise to improve glycemic control, achieve weight loss and improve cardiovascular health [Glucagon Use] : glucagon use [Hypoglycemia Management] : hypoglycemia management [Ketone Testing] : ketone testing [Action and use of Insulin] : action and use of short and long-acting insulin [Self Monitoring of Blood Glucose] : self monitoring of blood glucose [Insulin Self-Administration] : insulin self-administration [Injection Technique, Storage, Sharps Disposal] : injection technique, storage, and sharps disposal [Sick-Day Management] : sick-day management [Retinopathy Screening] : Patient was referred to ophthalmology for retinopathy screening [Diabetic Medications] : Risks and benefits of diabetic medications were discussed [FreeTextEntry1] : 1. T2DM, uncontrolled -Sample IVONNE 3 applied -Increase Ozempic 1 mg qwk - for now , increase Tresiba 26 un HS, resume Humalog 15-15-15 - actos 30 mg qd , Jardiance 25 mg qd - Crestor 40 mg qhs - hypo's precautions - monitor lipids, blood pressure - ophthalmology evaluation   rtc 2 weeks

## 2024-03-18 NOTE — HISTORY OF PRESENT ILLNESS
[FreeTextEntry1] : F/u for diabetes management  ***Mar. 13, 2024*** feels well overall denies new complaints, She is presently on Tresiba 16u qhs Humalog 20u BID breakfast and dinner, took one shot of mounjaro 2.5mg 2/3 and had severe abdominal pain.  The following week she resumed Ozempic 0.5mg qwkly she reports tolerating ozempic well but break through cravings persist.    *** Feb 01, 2024 *** decided against insulin pump. no more plans for pregnancy ran out of Ozempic about 2 months ago, and Humalog 2 weeks ago on Tresiba 16 un HS,   actos 30 mg qd , Jardiance 25 mg qd,  Crestor 40 mg HS no recent labs reports fbs low 200's, ppg-  in 300s  *** Aug 09, 2023 ***  feels better. being evaluated for IVF inquiring about clearance , but reports last a1c about 4-6 weeks ago- 7.3% taking Tresiba 14 un HS, Novolog 12 un tid ac meals,  actos 30 mg qd , Jardiance 25 mg qd,  Ozempic 0.5 mg qw reports fbs - 120's, ppg- low 200's  Thyr US (5/12/23)- RUP cystic 0.3 + 0.3 (TR1)  *** May 09, 2023 ***  changed her jobs. was off insurance for some time ran out of most of her meds a month or two ago presently only taking Jardiance 25 mg qd, occasional novolog taking  Tresiba 12 un HS, Novolog 15-15-10,  actos 30 mg qd ,   *** Aug 09, 2022 ***  had another spinal fusion on 7/27/22- still with a cervical collar, but recovering well taking Tresiba 12 un HS, Novolog 15-15-15,  actos 30 mg qd, Ozempic 1 mg had Jardiance 25 mg added  about a month ago not checking FS, but reports subjective late pm hypo's states that had labs done about 2 weeks ago  *** May 09, 2022 ***  missed f/u appts taking Levemir 20 units HS, Novolog 30 to 38 un ac B and D only, Ozempic 0.5 mg qw not taking actos stopped using the sensor (had rash from the tape) Reports fbs 200-220, ppg 180's- 270's  Thyr US (6/22/22)- Left isthmus cyst, 0.3 x 0.3 x 0.3 cm. There is a 0.5 x 0.4 x 0.6 cm cystic lesion just deep to the skin in the neck anterior to the right thyroid lobe. This is nonspecific but may represent a sebaceous cyst.   *** May 17, 2021 ***  Missed f/u in-person visits feels ok. s/p spinal fusion sx off Ozempic b/o pregnancy plans. had an ectopic pregnancy in January. now, decided against another pregnancy and wants to get back on ozempic  taking Levemir 20 units HS, Novolog 15-0-15 no recent labs reports fbs- 120's- 150's, p/B and p/D- mid 200's was using Dexcom G6, but stopped some time ago recalls being briefly on farxiga, but felt "gasy" on it  *** Phone visit  Dec 07, 2020 ***  started fertility evaluation. stopped actos and Tresiba taking Novolog 20 un tid ac, Ozempic 0.25 mg qw reports fbs - 110's, ppg- in mid 200's  no recent labs  HISTORY OF PRESENT ILLNESS.   Ms. TAPIA was diagnosed with Diabetes Mellitus Type 2 in 2014  Reports history HTN, dyslipidemia, sarcoidosis. She denies CAD,  known complications of retinopathy, nephropathy, or neuropathy.  She reports a GI intolerance on metformin, and is presently on Basaglar 20 un HS, Novolog 16-0-16. She was briefly on Victoza  (using samples) with some help, but is not taking right now. She was previously under care of Dr. Membreno. Blood sugars are checked 2-3 times a day. She reports FBS in low 200's, ppg- 240's- 260's Hypoglycemia frequency: none Diet: not following ADA Exercise: none  Last dilated eye - 4/23 Last podiatry visit  - 11/22 Last cardiology evaluation - none Last stress test - none Last 2-D Echo - does not recall Last nephrology evaluation - none Last neurology evaluation- none

## 2024-04-12 ENCOUNTER — APPOINTMENT (OUTPATIENT)
Dept: ENDOCRINOLOGY | Facility: CLINIC | Age: 44
End: 2024-04-12
Payer: COMMERCIAL

## 2024-04-12 VITALS
RESPIRATION RATE: 16 BRPM | OXYGEN SATURATION: 98 % | HEIGHT: 71 IN | DIASTOLIC BLOOD PRESSURE: 84 MMHG | TEMPERATURE: 98.8 F | HEART RATE: 81 BPM | WEIGHT: 214 LBS | SYSTOLIC BLOOD PRESSURE: 127 MMHG | BODY MASS INDEX: 29.96 KG/M2

## 2024-04-12 DIAGNOSIS — E11.65 TYPE 2 DIABETES MELLITUS WITH HYPERGLYCEMIA: ICD-10-CM

## 2024-04-12 DIAGNOSIS — I10 ESSENTIAL (PRIMARY) HYPERTENSION: ICD-10-CM

## 2024-04-12 DIAGNOSIS — E78.5 HYPERLIPIDEMIA, UNSPECIFIED: ICD-10-CM

## 2024-04-12 LAB — GLUCOSE BLDC GLUCOMTR-MCNC: 174

## 2024-04-12 PROCEDURE — 99214 OFFICE O/P EST MOD 30 MIN: CPT

## 2024-04-12 PROCEDURE — 82962 GLUCOSE BLOOD TEST: CPT

## 2024-04-12 RX ORDER — BLOOD-GLUCOSE SENSOR
EACH MISCELLANEOUS
Qty: 9 | Refills: 3 | Status: ACTIVE | COMMUNITY
Start: 2024-04-12 | End: 1900-01-01

## 2024-04-12 RX ORDER — PIOGLITAZONE HYDROCHLORIDE 30 MG/1
30 TABLET ORAL
Qty: 90 | Refills: 3 | Status: ACTIVE | COMMUNITY
Start: 2021-05-17 | End: 1900-01-01

## 2024-04-12 RX ORDER — EMPAGLIFLOZIN 25 MG/1
25 TABLET, FILM COATED ORAL DAILY
Qty: 1 | Refills: 3 | Status: ACTIVE | COMMUNITY
Start: 2023-05-09 | End: 1900-01-01

## 2024-04-12 RX ORDER — BLOOD-GLUCOSE,RECEIVER,CONT
EACH MISCELLANEOUS
Qty: 1 | Refills: 0 | Status: ACTIVE | COMMUNITY
Start: 2024-04-12 | End: 1900-01-01

## 2024-04-12 RX ORDER — BLOOD-GLUCOSE TRANSMITTER
EACH MISCELLANEOUS
Qty: 1 | Refills: 3 | Status: ACTIVE | COMMUNITY
Start: 2024-04-12 | End: 1900-01-01

## 2024-04-12 RX ORDER — PEN NEEDLE, DIABETIC 32 GX 1/6"
32G X 4 MM NEEDLE, DISPOSABLE MISCELLANEOUS
Qty: 4 | Refills: 3 | Status: ACTIVE | COMMUNITY
Start: 2023-05-09 | End: 1900-01-01

## 2024-04-12 RX ORDER — INSULIN LISPRO 100 [IU]/ML
100 INJECTION, SOLUTION INTRAVENOUS; SUBCUTANEOUS
Qty: 4 | Refills: 3 | Status: ACTIVE | COMMUNITY
Start: 2023-05-22 | End: 1900-01-01

## 2024-04-12 RX ORDER — INSULIN DEGLUDEC INJECTION 200 U/ML
200 INJECTION, SOLUTION SUBCUTANEOUS
Qty: 3 | Refills: 3 | Status: ACTIVE | COMMUNITY
Start: 2022-05-09 | End: 1900-01-01

## 2024-04-12 RX ORDER — LANCETS 33 GAUGE
EACH MISCELLANEOUS
Qty: 300 | Refills: 6 | Status: ACTIVE | COMMUNITY
Start: 2022-08-09 | End: 1900-01-01

## 2024-04-12 RX ORDER — ROSUVASTATIN CALCIUM 40 MG/1
40 TABLET, FILM COATED ORAL
Qty: 90 | Refills: 3 | Status: ACTIVE | COMMUNITY
Start: 2021-05-18 | End: 1900-01-01

## 2024-04-12 RX ORDER — BLOOD SUGAR DIAGNOSTIC
STRIP MISCELLANEOUS 3 TIMES DAILY
Qty: 3 | Refills: 6 | Status: ACTIVE | COMMUNITY
Start: 2022-08-09 | End: 1900-01-01

## 2024-04-12 NOTE — HISTORY OF PRESENT ILLNESS
[FreeTextEntry1] : F/u for diabetes management ***Apr. 12, 2024*** Feels well overall denies new complaints, She reports full compliance with the following regimen Tresiba 26 u qhs, Humalog 16 u TID ac meals, Ozempic 1mg qwk, Jardiance 25 mg qd, Pioglitazone 30mg qd.  took conrado 3 off because "it was always beeping" Bg in office today 174 mg/dL one hour after coffee.  ***Mar. 13, 2024*** feels well overall denies new complaints, She is presently on Tresiba 16u qhs Humalog 20u BID breakfast and dinner, took one shot of mounjaro 2.5mg 2/3 and had severe abdominal pain.  The following week she resumed Ozempic 0.5mg qwkly she reports tolerating ozempic well but break through cravings persist.    *** Feb 01, 2024 *** decided against insulin pump. no more plans for pregnancy ran out of Ozempic about 2 months ago, and Humalog 2 weeks ago on Tresiba 16 un HS,   actos 30 mg qd , Jardiance 25 mg qd,  Crestor 40 mg HS no recent labs reports fbs low 200's, ppg-  in 300s  *** Aug 09, 2023 ***  feels better. being evaluated for IVF inquiring about clearance , but reports last a1c about 4-6 weeks ago- 7.3% taking Tresiba 14 un HS, Novolog 12 un tid ac meals,  actos 30 mg qd , Jardiance 25 mg qd,  Ozempic 0.5 mg qw reports fbs - 120's, ppg- low 200's  Thyr  (5/12/23)- RUP cystic 0.3 + 0.3 (TR1)  *** May 09, 2023 ***  changed her jobs. was off insurance for some time ran out of most of her meds a month or two ago presently only taking Jardiance 25 mg qd, occasional novolog taking  Tresiba 12 un HS, Novolog 15-15-10,  actos 30 mg qd ,   *** Aug 09, 2022 ***  had another spinal fusion on 7/27/22- still with a cervical collar, but recovering well taking Tresiba 12 un HS, Novolog 15-15-15,  actos 30 mg qd, Ozempic 1 mg had Jardiance 25 mg added  about a month ago not checking FS, but reports subjective late pm hypo's states that had labs done about 2 weeks ago  *** May 09, 2022 ***  missed f/u appts taking Levemir 20 units HS, Novolog 30 to 38 un ac B and D only, Ozempic 0.5 mg qw not taking actos stopped using the sensor (had rash from the tape) Reports fbs 200-220, ppg 180's- 270's  Thyr US (6/22/22)- Left isthmus cyst, 0.3 x 0.3 x 0.3 cm. There is a 0.5 x 0.4 x 0.6 cm cystic lesion just deep to the skin in the neck anterior to the right thyroid lobe. This is nonspecific but may represent a sebaceous cyst.   *** May 17, 2021 ***  Missed f/u in-person visits feels ok. s/p spinal fusion sx off Ozempic b/o pregnancy plans. had an ectopic pregnancy in January. now, decided against another pregnancy and wants to get back on ozempic  taking Levemir 20 units HS, Novolog 15-0-15 no recent labs reports fbs- 120's- 150's, p/B and p/D- mid 200's was using Dexcom G6, but stopped some time ago recalls being briefly on farxiga, but felt "gasy" on it  *** Phone visit  Dec 07, 2020 ***  started fertility evaluation. stopped actos and Tresiba taking Novolog 20 un tid ac, Ozempic 0.25 mg qw reports fbs - 110's, ppg- in mid 200's  no recent labs  HISTORY OF PRESENT ILLNESS.   Ms. TAPIA was diagnosed with Diabetes Mellitus Type 2 in 2014  Reports history HTN, dyslipidemia, sarcoidosis. She denies CAD,  known complications of retinopathy, nephropathy, or neuropathy.  She reports a GI intolerance on metformin, and is presently on Basaglar 20 un HS, Novolog 16-0-16. She was briefly on Victoza  (using samples) with some help, but is not taking right now. She was previously under care of Dr. Membreno. Blood sugars are checked 2-3 times a day. She reports FBS in low 200's, ppg- 240's- 260's Hypoglycemia frequency: none Diet: not following ADA Exercise: none  Last dilated eye - 4/23 Last podiatry visit  - 11/22 Last cardiology evaluation - none Last stress test - none Last 2-D Echo - does not recall Last nephrology evaluation - none Last neurology evaluation- none

## 2024-04-12 NOTE — ASSESSMENT
[Diabetes Foot Care] : diabetes foot care [Long Term Vascular Complications] : long term vascular complications of diabetes [Carbohydrate Consistent Diet] : carbohydrate consistent diet [Importance of Diet and Exercise] : importance of diet and exercise to improve glycemic control, achieve weight loss and improve cardiovascular health [Exercise/Effect on Glucose] : exercise/effect on glucose [Hypoglycemia Management] : hypoglycemia management [Glucagon Use] : glucagon use [Ketone Testing] : ketone testing [Action and use of Insulin] : action and use of short and long-acting insulin [Self Monitoring of Blood Glucose] : self monitoring of blood glucose [Insulin Self-Administration] : insulin self-administration [Injection Technique, Storage, Sharps Disposal] : injection technique, storage, and sharps disposal [Sick-Day Management] : sick-day management [Retinopathy Screening] : Patient was referred to ophthalmology for retinopathy screening [Diabetic Medications] : Risks and benefits of diabetic medications were discussed [FreeTextEntry1] : 1. T2DM, uncontrolled -Bloodwork today -Resume Dexcom G6 per pt preference -Increase Ozempic 1 mg qwk - Continue Tresiba 26u qhs - Continue Humalog 16-16-16 - Continue Actos 30 mg qd , Jardiance 25 mg qd - Resume Crestor 40 mg qhs - hypo's precautions - ophthalmology evaluation  RTC 3 months

## 2024-04-16 LAB
25(OH)D3 SERPL-MCNC: 52.1 NG/ML
ALBUMIN SERPL ELPH-MCNC: 4.3 G/DL
ALP BLD-CCNC: 84 U/L
ALT SERPL-CCNC: 11 U/L
ANION GAP SERPL CALC-SCNC: 15 MMOL/L
AST SERPL-CCNC: 13 U/L
BASOPHILS # BLD AUTO: 0.06 K/UL
BASOPHILS NFR BLD AUTO: 0.6 %
BILIRUB SERPL-MCNC: 0.2 MG/DL
BUN SERPL-MCNC: 11 MG/DL
CALCIUM SERPL-MCNC: 10.1 MG/DL
CHLORIDE SERPL-SCNC: 99 MMOL/L
CHOLEST SERPL-MCNC: 265 MG/DL
CO2 SERPL-SCNC: 23 MMOL/L
CREAT SERPL-MCNC: 0.82 MG/DL
CREAT SPEC-SCNC: 49 MG/DL
EGFR: 90 ML/MIN/1.73M2
EOSINOPHIL # BLD AUTO: 0.08 K/UL
EOSINOPHIL NFR BLD AUTO: 0.8 %
ESTIMATED AVERAGE GLUCOSE: 192 MG/DL
FOLATE SERPL-MCNC: >20 NG/ML
FRUCTOSAMINE SERPL-MCNC: 291 UMOL/L
GLUCOSE SERPL-MCNC: 131 MG/DL
GLYCOMARK.: 1.1 UG/ML
HBA1C MFR BLD HPLC: 8.3 %
HCT VFR BLD CALC: 42.4 %
HDLC SERPL-MCNC: 75 MG/DL
HGB BLD-MCNC: 12.8 G/DL
IMM GRANULOCYTES NFR BLD AUTO: 0.4 %
LDLC SERPL CALC-MCNC: 174 MG/DL
LYMPHOCYTES # BLD AUTO: 2.72 K/UL
LYMPHOCYTES NFR BLD AUTO: 27 %
MAN DIFF?: NORMAL
MCHC RBC-ENTMCNC: 26.9 PG
MCHC RBC-ENTMCNC: 30.2 GM/DL
MCV RBC AUTO: 89.3 FL
MICROALBUMIN 24H UR DL<=1MG/L-MCNC: <1.2 MG/DL
MICROALBUMIN/CREAT 24H UR-RTO: NORMAL MG/G
MONOCYTES # BLD AUTO: 0.77 K/UL
MONOCYTES NFR BLD AUTO: 7.6 %
NEUTROPHILS # BLD AUTO: 6.42 K/UL
NEUTROPHILS NFR BLD AUTO: 63.6 %
NONHDLC SERPL-MCNC: 190 MG/DL
PLATELET # BLD AUTO: 520 K/UL
POTASSIUM SERPL-SCNC: 5 MMOL/L
PROT SERPL-MCNC: 7.9 G/DL
RBC # BLD: 4.75 M/UL
RBC # FLD: 14.9 %
SODIUM SERPL-SCNC: 137 MMOL/L
TRIGL SERPL-MCNC: 95 MG/DL
VIT B12 SERPL-MCNC: 760 PG/ML
WBC # FLD AUTO: 10.09 K/UL

## 2024-04-21 NOTE — ED ADULT NURSE NOTE - HIV OFFER
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  Clinically Significant Surgical Hx: tracheostomy    24 Hours Vitals Range:  Temp:  [97.7 °F (36.5 °C)-99.9 °F (37.7 °C)]   Pulse:  []   Resp:  [17-18]   BP: (131-145)/(76-88)   SpO2:  [95 %-100 %]     Labs:    Recent Labs     24  0411 24  0522   * 130*   K 4.3 3.6   CL 97 92*   CO2 20* 23   BUN 56* 47*   CREATININE 5.4* 5.2*   * 127*   PHOS 4.6* 4.4   MG 2.4 2.5        No results for input(s): "PH", "PCO2", "PO2", "HCO3", "POCSATURATED", "BE" in the last 72 hours.    ASSESSMENT/INTERVENTIONS  Patient resting comfortably. No respiratory concerns at this time.      Last VS   Temp: 97.7 °F (36.5 °C) ( 1608)  Pulse: 97 ( 1608)  Resp: 18 ( 0803)  BP: 145/83 ( 1244)  SpO2: 99 % ( 1608)      Extra trachs at bedside: #4 Shiley Cuffed, #6 Shiley Cuffed  Level of Consciousness: Level of Consciousness (AVPU): alert  Respiratory Effort: Respiratory Effort: Unlabored Expansion/Accessory Muscle Usage: Expansion/Accessory Muscles/Retractions: no use of accessory muscles  All Lung Field Breath Sounds: All Lung Fields Breath Sounds: coarse  JOSE Breath Sounds: diminished  LLL Breath Sounds: diminished  RUL Breath Sounds: diminished  RML Breath Sounds: diminished  RLL Breath Sounds: diminished  O2 Device/Concentration: 5L/21%  Surgical airway: Yes, Type: Shiley Size: 6, uncuffed  Ambu at bedside:       Active Orders   Respiratory Care    Oxygen Continuous     Frequency: Continuous     Number of Occurrences: Until Specified     Order Questions:      Device type: Low flow      Device: Trach " Collar      FiO2%: 28%      Titrate O2 per Oxygen Titration Protocol: Yes      To maintain SpO2 goal of: >= 90%      Notify MD of: Inability to achieve desired SpO2; Sudden change in patient status and requires 20% increase in FiO2; Patient requires >60% FiO2    Pulse Oximetry Continuous     Frequency: Continuous     Number of Occurrences: Until Specified    Routine tracheostomy care     Frequency: BID     Number of Occurrences: Until Specified       RECOMMENDATIONS    We recommend: RRT Recs: Continue POC per primary team.      FOLLOW-UP    Please call back the Rapid Response RT, Yadi Olmos RRT at x [phone] for any questions or concerns.         Previously Declined (within the last year)

## 2024-05-16 ENCOUNTER — APPOINTMENT (OUTPATIENT)
Dept: MATERNAL FETAL MEDICINE | Facility: CLINIC | Age: 44
End: 2024-05-16

## 2024-05-16 RX ORDER — SEMAGLUTIDE 1.34 MG/ML
4 INJECTION, SOLUTION SUBCUTANEOUS
Qty: 3 | Refills: 3 | Status: ACTIVE | COMMUNITY
Start: 2020-07-29

## 2024-06-14 ENCOUNTER — RX RENEWAL (OUTPATIENT)
Age: 44
End: 2024-06-14

## 2024-06-14 RX ORDER — FOLIC ACID 1 MG/1
1 TABLET ORAL DAILY
Qty: 90 | Refills: 1 | Status: ACTIVE | COMMUNITY
Start: 2023-05-15 | End: 1900-01-01

## 2024-06-16 ENCOUNTER — EMERGENCY (EMERGENCY)
Facility: HOSPITAL | Age: 44
LOS: 0 days | Discharge: HOME | End: 2024-06-16
Payer: SELF-PAY

## 2024-06-16 VITALS
WEIGHT: 209 LBS | RESPIRATION RATE: 14 BRPM | TEMPERATURE: 98 F | HEIGHT: 71 IN | SYSTOLIC BLOOD PRESSURE: 127 MMHG | DIASTOLIC BLOOD PRESSURE: 79 MMHG | OXYGEN SATURATION: 100 % | HEART RATE: 89 BPM

## 2024-06-16 VITALS
OXYGEN SATURATION: 98 % | DIASTOLIC BLOOD PRESSURE: 82 MMHG | HEART RATE: 67 BPM | RESPIRATION RATE: 16 BRPM | TEMPERATURE: 98 F | SYSTOLIC BLOOD PRESSURE: 121 MMHG

## 2024-06-16 DIAGNOSIS — R29.898 OTHER SYMPTOMS AND SIGNS INVOLVING THE MUSCULOSKELETAL SYSTEM: ICD-10-CM
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
## 2024-06-16 DIAGNOSIS — Z98.1 ARTHRODESIS STATUS: ICD-10-CM

## 2024-06-16 DIAGNOSIS — E11.9 TYPE 2 DIABETES MELLITUS WITHOUT COMPLICATIONS: ICD-10-CM

## 2024-06-16 DIAGNOSIS — S61.412A LACERATION WITHOUT FOREIGN BODY OF LEFT HAND, INITIAL ENCOUNTER: ICD-10-CM
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
## 2024-06-16 DIAGNOSIS — Z23 ENCOUNTER FOR IMMUNIZATION: ICD-10-CM

## 2024-06-16 PROCEDURE — 99284 EMERGENCY DEPT VISIT MOD MDM: CPT | Mod: 25

## 2024-06-16 PROCEDURE — 12001 RPR S/N/AX/GEN/TRNK 2.5CM/<: CPT

## 2024-06-16 RX ORDER — TETANUS TOXOID, REDUCED DIPHTHERIA TOXOID AND ACELLULAR PERTUSSIS VACCINE, ADSORBED 5; 2.5; 8; 8; 2.5 [IU]/.5ML; [IU]/.5ML; UG/.5ML; UG/.5ML; UG/.5ML
0.5 SUSPENSION INTRAMUSCULAR ONCE
Refills: 0 | Status: COMPLETED | OUTPATIENT
Start: 2024-06-16 | End: 2024-06-16

## 2024-06-16 RX ORDER — CEPHALEXIN 500 MG
500 CAPSULE ORAL ONCE
Refills: 0 | Status: COMPLETED | OUTPATIENT
Start: 2024-06-16 | End: 2024-06-16

## 2024-06-16 RX ORDER — CEPHALEXIN 500 MG
1 CAPSULE ORAL
Qty: 14 | Refills: 0
Start: 2024-06-16 | End: 2024-06-22

## 2024-06-16 RX ADMIN — TETANUS TOXOID, REDUCED DIPHTHERIA TOXOID AND ACELLULAR PERTUSSIS VACCINE, ADSORBED 0.5 MILLILITER(S): 5; 2.5; 8; 8; 2.5 SUSPENSION INTRAMUSCULAR at 11:48

## 2024-06-16 RX ADMIN — Medication 500 MILLIGRAM(S): at 11:48

## 2024-06-16 NOTE — ED ADULT NURSE NOTE - OBJECTIVE STATEMENT
n/a
44 year old female a/ox3 c/o left hand laceration after cutting a coconut yesterday, pt reports she woke up this morning with blood on her sheets and came to the er, no noted signs of bleeding at this time, no pus or drainage at this time, dressing dry clean and intact hx: dm, hld

## 2024-06-16 NOTE — ED PROVIDER NOTE - PHYSICAL EXAMINATION
PHYSICAL EXAM:    GENERAL: Alert, appears stated age, well appearing, non-toxic  SKIN: Warm, and dry. MMM.   HEAD: NC, AT  EYE: Normal lids/conjunctiva  ENT: Normal hearing, patent oropharynx   NECK: +supple. No meningismus, or JVD  Pulm: normal resp effort  Mskel: no erythema, cyanosis, edema. +L palm, above hypothenar eminence, 2cm laceration, clean, closed, no surrounding erythema/warmth/ttp/streaking/crepitus. no fb. from to hand/wrist. normal sensation. 5/5 strength to hand and wrist.   Neuro: AAOx3, normal gait.

## 2024-06-16 NOTE — ED PROVIDER NOTE - OBJECTIVE STATEMENT
45 y/o F with PMH insulin dependent diabetes, cervical fusion with mild residual hand weakness, presents with L palmar hand closed but intermittently mildly bleeding laceration x5pm yesterday while cutting a coconut.  she immediately irrigated and then cleaned with rubbing alcohol.   no fever, n/v.  denies other injuries, decreased ROM, paraesthesias, change in color, swelling, warmth, pus, FB, obvious deformity, large blood loss, knowledge of last tetanus, lightheadedness, palpitations, LOC.

## 2024-06-16 NOTE — ED PROVIDER NOTE - PATIENT PORTAL LINK FT
You can access the FollowMyHealth Patient Portal offered by [facility] by registering at the following website: [email or web address]. By joining [email or web address]s FollowMyHealth portal, you will also be able to view your health information using other applications (apps) compatible with our system.

## 2024-06-16 NOTE — ED PROVIDER NOTE - CLINICAL SUMMARY MEDICAL DECISION MAKING FREE TEXT BOX
laceration mostly closed. but pt relates intermtitently bleeding-- will dermabond  no active bleeding.   2+ pulses. cap refill brisk  delayed, >18 hr presentation from time of laceration, will give prophylactic abx to prevent infection and counseled on risks/red flags and to return for them.  Reviewed necessity for follow up. Counseled on red flags and to return for them.  Patient appears well on discharge.
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
## 2024-06-16 NOTE — ED ADULT TRIAGE NOTE - CHIEF COMPLAINT QUOTE
Came in for left hand laceration from kitchen knife while cutting coconut yesterday. Patient states bleeding stopped last night and started again today. No active bleeding in triage. Wound cleaned and dressed. No PMH.

## 2024-06-16 NOTE — ED ADULT NURSE NOTE - NSFALLRISKINTERV_ED_ALL_ED

## 2024-07-18 ENCOUNTER — APPOINTMENT (OUTPATIENT)
Dept: ENDOCRINOLOGY | Facility: CLINIC | Age: 44
End: 2024-07-18
Payer: COMMERCIAL

## 2024-07-18 VITALS
TEMPERATURE: 99 F | WEIGHT: 214 LBS | DIASTOLIC BLOOD PRESSURE: 77 MMHG | BODY MASS INDEX: 29.96 KG/M2 | OXYGEN SATURATION: 97 % | SYSTOLIC BLOOD PRESSURE: 115 MMHG | RESPIRATION RATE: 16 BRPM | HEART RATE: 90 BPM | HEIGHT: 71 IN

## 2024-07-18 DIAGNOSIS — E04.2 NONTOXIC MULTINODULAR GOITER: ICD-10-CM

## 2024-07-18 DIAGNOSIS — E11.65 TYPE 2 DIABETES MELLITUS WITH HYPERGLYCEMIA: ICD-10-CM

## 2024-07-18 DIAGNOSIS — E78.5 HYPERLIPIDEMIA, UNSPECIFIED: ICD-10-CM

## 2024-07-18 DIAGNOSIS — I10 ESSENTIAL (PRIMARY) HYPERTENSION: ICD-10-CM

## 2024-07-18 LAB — GLUCOSE BLDC GLUCOMTR-MCNC: 234

## 2024-07-18 PROCEDURE — 99214 OFFICE O/P EST MOD 30 MIN: CPT | Mod: 25

## 2024-07-18 PROCEDURE — 82962 GLUCOSE BLOOD TEST: CPT

## 2024-07-19 LAB
25(OH)D3 SERPL-MCNC: 42.8 NG/ML
ALBUMIN SERPL ELPH-MCNC: 4.5 G/DL
ALP BLD-CCNC: 83 U/L
ALT SERPL-CCNC: 14 U/L
ANION GAP SERPL CALC-SCNC: 14 MMOL/L
AST SERPL-CCNC: 16 U/L
BASOPHILS # BLD AUTO: 0.04 K/UL
BASOPHILS NFR BLD AUTO: 0.4 %
BILIRUB SERPL-MCNC: 0.2 MG/DL
BUN SERPL-MCNC: 12 MG/DL
CALCIUM SERPL-MCNC: 9.6 MG/DL
CHLORIDE SERPL-SCNC: 98 MMOL/L
CHOLEST SERPL-MCNC: 211 MG/DL
CO2 SERPL-SCNC: 24 MMOL/L
CREAT SERPL-MCNC: 1.08 MG/DL
CREAT SPEC-SCNC: 60 MG/DL
EGFR: 65 ML/MIN/1.73M2
EOSINOPHIL # BLD AUTO: 0.13 K/UL
EOSINOPHIL NFR BLD AUTO: 1.3 %
ESTIMATED AVERAGE GLUCOSE: 169 MG/DL
FOLATE SERPL-MCNC: 11.5 NG/ML
FRUCTOSAMINE SERPL-MCNC: 306 UMOL/L
GLUCOSE SERPL-MCNC: 265 MG/DL
GLYCOMARK.: 0.8 UG/ML
HBA1C MFR BLD HPLC: 7.5 %
HCT VFR BLD CALC: 40.3 %
HDLC SERPL-MCNC: 89 MG/DL
HGB BLD-MCNC: 12.5 G/DL
IMM GRANULOCYTES NFR BLD AUTO: 0.3 %
LDLC SERPL CALC-MCNC: 103 MG/DL
LYMPHOCYTES # BLD AUTO: 3.17 K/UL
LYMPHOCYTES NFR BLD AUTO: 30.7 %
MAN DIFF?: NORMAL
MCHC RBC-ENTMCNC: 28.6 PG
MCHC RBC-ENTMCNC: 31 GM/DL
MCV RBC AUTO: 92.2 FL
MICROALBUMIN 24H UR DL<=1MG/L-MCNC: <1.2 MG/DL
MICROALBUMIN/CREAT 24H UR-RTO: NORMAL MG/G
MONOCYTES # BLD AUTO: 0.74 K/UL
MONOCYTES NFR BLD AUTO: 7.2 %
NEUTROPHILS # BLD AUTO: 6.22 K/UL
NEUTROPHILS NFR BLD AUTO: 60.1 %
NONHDLC SERPL-MCNC: 122 MG/DL
PLATELET # BLD AUTO: 413 K/UL
POTASSIUM SERPL-SCNC: 4.4 MMOL/L
PROT SERPL-MCNC: 7.8 G/DL
RBC # BLD: 4.37 M/UL
RBC # FLD: 15.7 %
SODIUM SERPL-SCNC: 135 MMOL/L
T4 FREE SERPL-MCNC: 1.5 NG/DL
TRIGL SERPL-MCNC: 113 MG/DL
TSH SERPL-ACNC: 1.17 UIU/ML
VIT B12 SERPL-MCNC: 1332 PG/ML
WBC # FLD AUTO: 10.33 K/UL

## 2024-08-16 ENCOUNTER — APPOINTMENT (OUTPATIENT)
Dept: ENDOCRINOLOGY | Facility: CLINIC | Age: 44
End: 2024-08-16
Payer: COMMERCIAL

## 2024-08-16 VITALS
RESPIRATION RATE: 17 BRPM | HEART RATE: 95 BPM | SYSTOLIC BLOOD PRESSURE: 106 MMHG | HEIGHT: 71 IN | TEMPERATURE: 98 F | DIASTOLIC BLOOD PRESSURE: 75 MMHG | BODY MASS INDEX: 29.96 KG/M2 | WEIGHT: 214 LBS | OXYGEN SATURATION: 98 %

## 2024-08-16 DIAGNOSIS — E78.5 HYPERLIPIDEMIA, UNSPECIFIED: ICD-10-CM

## 2024-08-16 DIAGNOSIS — E11.65 TYPE 2 DIABETES MELLITUS WITH HYPERGLYCEMIA: ICD-10-CM

## 2024-08-16 LAB — GLUCOSE BLDC GLUCOMTR-MCNC: 172

## 2024-08-16 PROCEDURE — 99214 OFFICE O/P EST MOD 30 MIN: CPT | Mod: 25

## 2024-08-16 PROCEDURE — 82962 GLUCOSE BLOOD TEST: CPT

## 2024-08-16 NOTE — ASSESSMENT
[Diabetes Foot Care] : diabetes foot care [Long Term Vascular Complications] : long term vascular complications of diabetes [Carbohydrate Consistent Diet] : carbohydrate consistent diet [Importance of Diet and Exercise] : importance of diet and exercise to improve glycemic control, achieve weight loss and improve cardiovascular health [Exercise/Effect on Glucose] : exercise/effect on glucose [Hypoglycemia Management] : hypoglycemia management [Glucagon Use] : glucagon use [Ketone Testing] : ketone testing [Action and use of Insulin] : action and use of short and long-acting insulin [Self Monitoring of Blood Glucose] : self monitoring of blood glucose [Insulin Self-Administration] : insulin self-administration [Injection Technique, Storage, Sharps Disposal] : injection technique, storage, and sharps disposal [Sick-Day Management] : sick-day management [Retinopathy Screening] : Patient was referred to ophthalmology for retinopathy screening [Diabetic Medications] : Risks and benefits of diabetic medications were discussed [FreeTextEntry1] : 1. T2DM, Improved adherence to regimen, A1c 7.6% goal is <6.5%.   - Bloodwork results discussed today -Does not want to resume Dexcom G6 - Continue Ozempic 1mg qwk and up titrate as directed - Continue Tresiba 10u qhs  -Start HISS BID ac Breakfast, Lunch (4-0-77-13-15-17-18-19-20) - Continue Actos 30 mg qd , Jardiance 25 mg qd - Continue Crestor 40 mg qhs -All instructions written out and provided to patient. - hypo's precautions discussed - Wants to get Liposuction surgery , reports required A1c < 7.0%  RTC 2-3 months

## 2024-08-16 NOTE — HISTORY OF PRESENT ILLNESS
[FreeTextEntry1] : F/u for diabetes management  ***Aug. 16,2024*** Feels well overall denies new complaints, feels she is doing much better and has made dietary changes.  She is presently on ozempic 1mg qwk (on 2mg pen), Tresiba 10 u qhs, Humalog 15u BID breakfast, lunch, Actos 30 mg qd , Jardiance 25 mg qd Pt denies subjective HYPOs Pt reports typical fbg 100mg/dL Post lunch can be as high as 230mg/dL because Pt can often forget at work  ***July 18, 2024*** Feels well overall denies new complaints, She reports taking Tresiba 12-15 u qhs doesn't take it at all if she doesn't eat dinner because she doesn't want to crash, Humalog 12 u BID ac Breakfast & Dinner, Ozempic 1mg qwk, Jardiance 25 mg qd, Pioglitazone 30 mg qd.   Check FBG once every other day and reports typical range 110 - 120 mg/dL   ***Apr. 12, 2024*** Feels well overall denies new complaints, She reports full compliance with the following regimen Tresiba 26 u qhs, Humalog 16 u TID ac meals, Ozempic 1mg qwk, Jardiance 25 mg qd, Pioglitazone 30mg qd.  took conrado 3 off because "it was always beeping" Bg in office today 174 mg/dL one hour after coffee.  ***Mar. 13, 2024*** feels well overall denies new complaints, She is presently on Tresiba 16u qhs Humalog 20u BID breakfast and dinner, took one shot of mounjaro 2.5mg 2/3 and had severe abdominal pain.  The following week she resumed Ozempic 0.5mg qwkly she reports tolerating ozempic well but break through cravings persist.    *** Feb 01, 2024 *** decided against insulin pump. no more plans for pregnancy ran out of Ozempic about 2 months ago, and Humalog 2 weeks ago on Tresiba 16 un HS,   actos 30 mg qd , Jardiance 25 mg qd,  Crestor 40 mg HS no recent labs reports fbs low 200's, ppg-  in 300s  *** Aug 09, 2023 ***  feels better. being evaluated for IVF inquiring about clearance , but reports last a1c about 4-6 weeks ago- 7.3% taking Tresiba 14 un HS, Novolog 12 un tid ac meals,  actos 30 mg qd , Jardiance 25 mg qd,  Ozempic 0.5 mg qw reports fbs - 120's, ppg- low 200's  Thyr US (5/12/23)- RUP cystic 0.3 + 0.3 (TR1)  *** May 09, 2023 ***  changed her jobs. was off insurance for some time ran out of most of her meds a month or two ago presently only taking Jardiance 25 mg qd, occasional novolog taking  Tresiba 12 un HS, Novolog 15-15-10,  actos 30 mg qd ,   *** Aug 09, 2022 ***  had another spinal fusion on 7/27/22- still with a cervical collar, but recovering well taking Tresiba 12 un HS, Novolog 15-15-15,  actos 30 mg qd, Ozempic 1 mg had Jardiance 25 mg added  about a month ago not checking FS, but reports subjective late pm hypo's states that had labs done about 2 weeks ago  *** May 09, 2022 ***  missed f/u appts taking Levemir 20 units HS, Novolog 30 to 38 un ac B and D only, Ozempic 0.5 mg qw not taking actos stopped using the sensor (had rash from the tape) Reports fbs 200-220, ppg 180's- 270's  Thyr US (6/22/22)- Left isthmus cyst, 0.3 x 0.3 x 0.3 cm. There is a 0.5 x 0.4 x 0.6 cm cystic lesion just deep to the skin in the neck anterior to the right thyroid lobe. This is nonspecific but may represent a sebaceous cyst.   *** May 17, 2021 ***  Missed f/u in-person visits feels ok. s/p spinal fusion sx off Ozempic b/o pregnancy plans. had an ectopic pregnancy in January. now, decided against another pregnancy and wants to get back on ozempic  taking Levemir 20 units HS, Novolog 15-0-15 no recent labs reports fbs- 120's- 150's, p/B and p/D- mid 200's was using Dexcom G6, but stopped some time ago recalls being briefly on farxiga, but felt "gasy" on it  *** Phone visit  Dec 07, 2020 ***  started fertility evaluation. stopped actos and Tresiba taking Novolog 20 un tid ac, Ozempic 0.25 mg qw reports fbs - 110's, ppg- in mid 200's  no recent labs  HISTORY OF PRESENT ILLNESS.   Ms. TAPIA was diagnosed with Diabetes Mellitus Type 2 in 2014  Reports history HTN, dyslipidemia, sarcoidosis. She denies CAD,  known complications of retinopathy, nephropathy, or neuropathy.  She reports a GI intolerance on metformin, and is presently on Basaglar 20 un HS, Novolog 16-0-16. She was briefly on Victoza  (using samples) with some help, but is not taking right now. She was previously under care of Dr. Membreno. Blood sugars are checked 2-3 times a day. She reports FBS in low 200's, ppg- 240's- 260's Hypoglycemia frequency: none Diet: not following ADA Exercise: none  Last dilated eye - 4/23 Last podiatry visit  - 11/22 Last cardiology evaluation - none Last stress test - none Last 2-D Echo - does not recall Last nephrology evaluation - none Last neurology evaluation- none

## 2024-10-04 ENCOUNTER — APPOINTMENT (OUTPATIENT)
Dept: ENDOCRINOLOGY | Facility: CLINIC | Age: 44
End: 2024-10-04

## 2025-01-09 NOTE — ED ADULT TRIAGE NOTE - PRO INTERPRETER NEED 2
Patient received radiation therapy to brain.    Fraction:  9/10  Dose:  27Gy/30Gy  Time:  1317    Gloria Ng, RTT   English

## 2025-04-02 ENCOUNTER — APPOINTMENT (OUTPATIENT)
Dept: ENDOCRINOLOGY | Facility: CLINIC | Age: 45
End: 2025-04-02

## 2025-06-02 ENCOUNTER — APPOINTMENT (OUTPATIENT)
Dept: ENDOCRINOLOGY | Facility: CLINIC | Age: 45
End: 2025-06-02
Payer: COMMERCIAL

## 2025-06-02 DIAGNOSIS — I10 ESSENTIAL (PRIMARY) HYPERTENSION: ICD-10-CM

## 2025-06-02 DIAGNOSIS — E78.5 HYPERLIPIDEMIA, UNSPECIFIED: ICD-10-CM

## 2025-06-02 DIAGNOSIS — E11.65 TYPE 2 DIABETES MELLITUS WITH HYPERGLYCEMIA: ICD-10-CM

## 2025-06-02 PROCEDURE — 99214 OFFICE O/P EST MOD 30 MIN: CPT

## 2025-06-02 PROCEDURE — 82962 GLUCOSE BLOOD TEST: CPT

## 2025-06-04 LAB
25(OH)D3 SERPL-MCNC: 27.1 NG/ML
ALBUMIN SERPL ELPH-MCNC: 3.7 G/DL
ALP BLD-CCNC: 105 U/L
ALT SERPL-CCNC: 17 U/L
ANION GAP SERPL CALC-SCNC: 12 MMOL/L
AST SERPL-CCNC: 17 U/L
BASOPHILS # BLD AUTO: 0.04 K/UL
BASOPHILS NFR BLD AUTO: 0.3 %
BILIRUB SERPL-MCNC: <0.2 MG/DL
BUN SERPL-MCNC: 14 MG/DL
CALCIUM SERPL-MCNC: 9.2 MG/DL
CHLORIDE SERPL-SCNC: 101 MMOL/L
CHOLEST SERPL-MCNC: 274 MG/DL
CO2 SERPL-SCNC: 22 MMOL/L
CREAT SERPL-MCNC: 0.8 MG/DL
CREAT SPEC-SCNC: 101 MG/DL
EGFRCR SERPLBLD CKD-EPI 2021: 92 ML/MIN/1.73M2
EOSINOPHIL # BLD AUTO: 0.17 K/UL
EOSINOPHIL NFR BLD AUTO: 1.4 %
ESTIMATED AVERAGE GLUCOSE: 303 MG/DL
FOLATE SERPL-MCNC: 6.6 NG/ML
FRUCTOSAMINE SERPL-MCNC: 386 UMOL/L
GLUCOSE SERPL-MCNC: 251 MG/DL
GLYCOMARK.: 1.3 UG/ML
HBA1C MFR BLD HPLC: 12.2 %
HCT VFR BLD CALC: 35.5 %
HDLC SERPL-MCNC: 83 MG/DL
HGB BLD-MCNC: 10.3 G/DL
IMM GRANULOCYTES NFR BLD AUTO: 0.5 %
LDLC SERPL-MCNC: 180 MG/DL
LYMPHOCYTES # BLD AUTO: 3.17 K/UL
LYMPHOCYTES NFR BLD AUTO: 26.2 %
MAN DIFF?: NORMAL
MCHC RBC-ENTMCNC: 23.6 PG
MCHC RBC-ENTMCNC: 29 G/DL
MCV RBC AUTO: 81.2 FL
MICROALBUMIN 24H UR DL<=1MG/L-MCNC: <1.2 MG/DL
MICROALBUMIN/CREAT 24H UR-RTO: NORMAL MG/G
MONOCYTES # BLD AUTO: 0.91 K/UL
MONOCYTES NFR BLD AUTO: 7.5 %
NEUTROPHILS # BLD AUTO: 7.75 K/UL
NEUTROPHILS NFR BLD AUTO: 64.1 %
NONHDLC SERPL-MCNC: 191 MG/DL
PLATELET # BLD AUTO: 563 K/UL
POTASSIUM SERPL-SCNC: 4.4 MMOL/L
PROT SERPL-MCNC: 7.7 G/DL
RBC # BLD: 4.37 M/UL
RBC # FLD: 17.4 %
SODIUM SERPL-SCNC: 135 MMOL/L
T4 FREE SERPL-MCNC: 1.2 NG/DL
TRIGL SERPL-MCNC: 70 MG/DL
TSH SERPL-ACNC: 1.48 UIU/ML
VIT B12 SERPL-MCNC: 822 PG/ML
WBC # FLD AUTO: 12.1 K/UL